# Patient Record
Sex: FEMALE | Race: WHITE | NOT HISPANIC OR LATINO | Employment: FULL TIME | URBAN - METROPOLITAN AREA
[De-identification: names, ages, dates, MRNs, and addresses within clinical notes are randomized per-mention and may not be internally consistent; named-entity substitution may affect disease eponyms.]

---

## 2018-04-11 ENCOUNTER — APPOINTMENT (OUTPATIENT)
Dept: CT IMAGING | Facility: HOSPITAL | Age: 53
End: 2018-04-11

## 2018-04-11 ENCOUNTER — HOSPITAL ENCOUNTER (INPATIENT)
Facility: HOSPITAL | Age: 53
LOS: 2 days | Discharge: HOME | End: 2018-04-14
Attending: EMERGENCY MEDICINE | Admitting: INTERNAL MEDICINE

## 2018-04-11 ENCOUNTER — APPOINTMENT (OUTPATIENT)
Dept: GENERAL RADIOLOGY | Facility: HOSPITAL | Age: 53
End: 2018-04-11

## 2018-04-11 DIAGNOSIS — J44.1 COPD EXACERBATION (HCC): ICD-10-CM

## 2018-04-11 DIAGNOSIS — I20.8 STABLE ANGINA (HCC): ICD-10-CM

## 2018-04-11 DIAGNOSIS — I63.30 CEREBROVASCULAR ACCIDENT (CVA) DUE TO THROMBOSIS OF CEREBRAL ARTERY (HCC): ICD-10-CM

## 2018-04-11 DIAGNOSIS — R56.9 NEW ONSET SEIZURE (HCC): Primary | ICD-10-CM

## 2018-04-11 LAB
ALBUMIN SERPL-MCNC: 3.5 G/DL (ref 3.5–5.2)
ALBUMIN/GLOB SERPL: 0.9 G/DL
ALP SERPL-CCNC: 121 U/L (ref 39–117)
ALT SERPL W P-5'-P-CCNC: 22 U/L (ref 1–33)
ANION GAP SERPL CALCULATED.3IONS-SCNC: 19.3 MMOL/L
AST SERPL-CCNC: 48 U/L (ref 1–32)
BASOPHILS # BLD AUTO: 0.04 10*3/MM3 (ref 0–0.2)
BASOPHILS NFR BLD AUTO: 0.3 % (ref 0–1.5)
BILIRUB SERPL-MCNC: 1.1 MG/DL (ref 0.1–1.2)
BUN BLD-MCNC: 8 MG/DL (ref 6–20)
BUN/CREAT SERPL: 13.6 (ref 7–25)
CALCIUM SPEC-SCNC: 8.8 MG/DL (ref 8.6–10.5)
CHLORIDE SERPL-SCNC: 94 MMOL/L (ref 98–107)
CK SERPL-CCNC: 72 U/L (ref 20–180)
CO2 SERPL-SCNC: 20.7 MMOL/L (ref 22–29)
CREAT BLD-MCNC: 0.59 MG/DL (ref 0.57–1)
DEPRECATED RDW RBC AUTO: 46.5 FL (ref 37–54)
EOSINOPHIL # BLD AUTO: 0.09 10*3/MM3 (ref 0–0.7)
EOSINOPHIL NFR BLD AUTO: 0.7 % (ref 0.3–6.2)
ERYTHROCYTE [DISTWIDTH] IN BLOOD BY AUTOMATED COUNT: 13.1 % (ref 11.7–13)
ETHANOL BLD-MCNC: <10 MG/DL (ref 0–10)
ETHANOL UR QL: <0.01 %
GFR SERPL CREATININE-BSD FRML MDRD: 108 ML/MIN/1.73
GLOBULIN UR ELPH-MCNC: 4 GM/DL
GLUCOSE BLD-MCNC: 210 MG/DL (ref 65–99)
HCT VFR BLD AUTO: 44.1 % (ref 35.6–45.5)
HGB BLD-MCNC: 14.8 G/DL (ref 11.9–15.5)
IMM GRANULOCYTES # BLD: 0.03 10*3/MM3 (ref 0–0.03)
IMM GRANULOCYTES NFR BLD: 0.2 % (ref 0–0.5)
INR PPP: 1.04 (ref 0.9–1.1)
LIPASE SERPL-CCNC: 36 U/L (ref 13–60)
LYMPHOCYTES # BLD AUTO: 4.84 10*3/MM3 (ref 0.9–4.8)
LYMPHOCYTES NFR BLD AUTO: 37.7 % (ref 19.6–45.3)
MAGNESIUM SERPL-MCNC: 1.7 MG/DL (ref 1.6–2.6)
MCH RBC QN AUTO: 32.7 PG (ref 26.9–32)
MCHC RBC AUTO-ENTMCNC: 33.6 G/DL (ref 32.4–36.3)
MCV RBC AUTO: 97.6 FL (ref 80.5–98.2)
MONOCYTES # BLD AUTO: 0.68 10*3/MM3 (ref 0.2–1.2)
MONOCYTES NFR BLD AUTO: 5.3 % (ref 5–12)
NEUTROPHILS # BLD AUTO: 7.17 10*3/MM3 (ref 1.9–8.1)
NEUTROPHILS NFR BLD AUTO: 55.8 % (ref 42.7–76)
PLATELET # BLD AUTO: 150 10*3/MM3 (ref 140–500)
PMV BLD AUTO: 12.3 FL (ref 6–12)
POTASSIUM BLD-SCNC: 3.3 MMOL/L (ref 3.5–5.2)
PROT SERPL-MCNC: 7.5 G/DL (ref 6–8.5)
PROTHROMBIN TIME: 13.4 SECONDS (ref 11.7–14.2)
RBC # BLD AUTO: 4.52 10*6/MM3 (ref 3.9–5.2)
SODIUM BLD-SCNC: 134 MMOL/L (ref 136–145)
T4 FREE SERPL-MCNC: 0.99 NG/DL (ref 0.93–1.7)
TROPONIN T SERPL-MCNC: <0.01 NG/ML (ref 0–0.03)
TSH SERPL DL<=0.05 MIU/L-ACNC: 1.76 MIU/ML (ref 0.27–4.2)
WBC NRBC COR # BLD: 12.85 10*3/MM3 (ref 4.5–10.7)

## 2018-04-11 PROCEDURE — 93005 ELECTROCARDIOGRAM TRACING: CPT | Performed by: EMERGENCY MEDICINE

## 2018-04-11 PROCEDURE — 71046 X-RAY EXAM CHEST 2 VIEWS: CPT

## 2018-04-11 PROCEDURE — 25010000002 METHYLPREDNISOLONE PER 125 MG: Performed by: EMERGENCY MEDICINE

## 2018-04-11 PROCEDURE — 85610 PROTHROMBIN TIME: CPT | Performed by: EMERGENCY MEDICINE

## 2018-04-11 PROCEDURE — 25010000003 LEVETIRACETAM IN NACL 0.75% 1000 MG/100ML SOLUTION: Performed by: EMERGENCY MEDICINE

## 2018-04-11 PROCEDURE — 80053 COMPREHEN METABOLIC PANEL: CPT | Performed by: EMERGENCY MEDICINE

## 2018-04-11 PROCEDURE — 36415 COLL VENOUS BLD VENIPUNCTURE: CPT

## 2018-04-11 PROCEDURE — 80307 DRUG TEST PRSMV CHEM ANLYZR: CPT | Performed by: EMERGENCY MEDICINE

## 2018-04-11 PROCEDURE — 81003 URINALYSIS AUTO W/O SCOPE: CPT | Performed by: EMERGENCY MEDICINE

## 2018-04-11 PROCEDURE — 82550 ASSAY OF CK (CPK): CPT | Performed by: EMERGENCY MEDICINE

## 2018-04-11 PROCEDURE — 83735 ASSAY OF MAGNESIUM: CPT | Performed by: EMERGENCY MEDICINE

## 2018-04-11 PROCEDURE — 99284 EMERGENCY DEPT VISIT MOD MDM: CPT

## 2018-04-11 PROCEDURE — 83690 ASSAY OF LIPASE: CPT | Performed by: EMERGENCY MEDICINE

## 2018-04-11 PROCEDURE — 84439 ASSAY OF FREE THYROXINE: CPT | Performed by: EMERGENCY MEDICINE

## 2018-04-11 PROCEDURE — 84484 ASSAY OF TROPONIN QUANT: CPT | Performed by: EMERGENCY MEDICINE

## 2018-04-11 PROCEDURE — 83036 HEMOGLOBIN GLYCOSYLATED A1C: CPT | Performed by: INTERNAL MEDICINE

## 2018-04-11 PROCEDURE — 85025 COMPLETE CBC W/AUTO DIFF WBC: CPT | Performed by: EMERGENCY MEDICINE

## 2018-04-11 PROCEDURE — 70450 CT HEAD/BRAIN W/O DYE: CPT

## 2018-04-11 PROCEDURE — 84443 ASSAY THYROID STIM HORMONE: CPT | Performed by: EMERGENCY MEDICINE

## 2018-04-11 PROCEDURE — 93010 ELECTROCARDIOGRAM REPORT: CPT | Performed by: INTERNAL MEDICINE

## 2018-04-11 RX ORDER — METHYLPREDNISOLONE SODIUM SUCCINATE 125 MG/2ML
125 INJECTION, POWDER, LYOPHILIZED, FOR SOLUTION INTRAMUSCULAR; INTRAVENOUS ONCE
Status: COMPLETED | OUTPATIENT
Start: 2018-04-11 | End: 2018-04-11

## 2018-04-11 RX ORDER — IPRATROPIUM BROMIDE AND ALBUTEROL SULFATE 2.5; .5 MG/3ML; MG/3ML
3 SOLUTION RESPIRATORY (INHALATION) ONCE
Status: COMPLETED | OUTPATIENT
Start: 2018-04-11 | End: 2018-04-12

## 2018-04-11 RX ORDER — LEVETIRACETAM 10 MG/ML
1000 INJECTION INTRAVASCULAR ONCE
Status: COMPLETED | OUTPATIENT
Start: 2018-04-11 | End: 2018-04-12

## 2018-04-11 RX ORDER — SODIUM CHLORIDE 0.9 % (FLUSH) 0.9 %
10 SYRINGE (ML) INJECTION AS NEEDED
Status: DISCONTINUED | OUTPATIENT
Start: 2018-04-11 | End: 2018-04-14 | Stop reason: HOSPADM

## 2018-04-11 RX ADMIN — METHYLPREDNISOLONE SODIUM SUCCINATE 125 MG: 125 INJECTION, POWDER, FOR SOLUTION INTRAMUSCULAR; INTRAVENOUS at 23:45

## 2018-04-11 RX ADMIN — LEVETIRACETAM 1000 MG: 10 INJECTION INTRAVENOUS at 23:50

## 2018-04-11 RX ADMIN — SODIUM CHLORIDE 500 ML: 9 INJECTION, SOLUTION INTRAVENOUS at 23:54

## 2018-04-12 ENCOUNTER — APPOINTMENT (OUTPATIENT)
Dept: MRI IMAGING | Facility: HOSPITAL | Age: 53
End: 2018-04-12
Attending: INTERNAL MEDICINE

## 2018-04-12 ENCOUNTER — APPOINTMENT (OUTPATIENT)
Dept: NEUROLOGY | Facility: HOSPITAL | Age: 53
End: 2018-04-12
Attending: INTERNAL MEDICINE

## 2018-04-12 ENCOUNTER — APPOINTMENT (OUTPATIENT)
Dept: CARDIOLOGY | Facility: HOSPITAL | Age: 53
End: 2018-04-12
Attending: HOSPITALIST

## 2018-04-12 PROBLEM — R56.9 NEW ONSET SEIZURE (HCC): Status: ACTIVE | Noted: 2018-04-12

## 2018-04-12 PROBLEM — I10 HYPERTENSION: Status: ACTIVE | Noted: 2018-04-12

## 2018-04-12 PROBLEM — J44.9 COPD (CHRONIC OBSTRUCTIVE PULMONARY DISEASE) (HCC): Status: ACTIVE | Noted: 2018-04-12

## 2018-04-12 PROBLEM — E11.9 TYPE 2 DIABETES MELLITUS: Status: ACTIVE | Noted: 2018-04-12

## 2018-04-12 PROBLEM — I63.30 CEREBROVASCULAR ACCIDENT (CVA) DUE TO THROMBOSIS OF CEREBRAL ARTERY (HCC): Status: ACTIVE | Noted: 2018-04-12

## 2018-04-12 LAB
AMPHET+METHAMPHET UR QL: NEGATIVE
ANION GAP SERPL CALCULATED.3IONS-SCNC: 15.4 MMOL/L
ARTERIAL PATENCY WRIST A: ABNORMAL
ASCENDING AORTA: 3.1 CM
ATMOSPHERIC PRESS: 752.2 MMHG
BARBITURATES UR QL SCN: NEGATIVE
BASE EXCESS BLDA CALC-SCNC: 0.1 MMOL/L (ref 0–2)
BASOPHILS # BLD AUTO: 0.01 10*3/MM3 (ref 0–0.2)
BASOPHILS NFR BLD AUTO: 0.1 % (ref 0–1.5)
BDY SITE: ABNORMAL
BENZODIAZ UR QL SCN: NEGATIVE
BH CV ECHO MEAS - ACS: 1.8 CM
BH CV ECHO MEAS - AO MEAN PG (FULL): 1 MMHG
BH CV ECHO MEAS - AO MEAN PG: 6 MMHG
BH CV ECHO MEAS - AO ROOT AREA (BSA CORRECTED): 1.4
BH CV ECHO MEAS - AO ROOT AREA: 6.2 CM^2
BH CV ECHO MEAS - AO ROOT DIAM: 2.8 CM
BH CV ECHO MEAS - AO V2 MEAN: 115 CM/SEC
BH CV ECHO MEAS - AO V2 VTI: 25.2 CM
BH CV ECHO MEAS - AVA(I,A): 2.9 CM^2
BH CV ECHO MEAS - AVA(I,D): 2.9 CM^2
BH CV ECHO MEAS - BSA(HAYCOCK): 2.1 M^2
BH CV ECHO MEAS - BSA: 2 M^2
BH CV ECHO MEAS - BZI_BMI: 36.4 KILOGRAMS/M^2
BH CV ECHO MEAS - BZI_METRIC_HEIGHT: 162.6 CM
BH CV ECHO MEAS - BZI_METRIC_WEIGHT: 96.2 KG
BH CV ECHO MEAS - CONTRAST EF 4CH: 59.1 ML/M^2
BH CV ECHO MEAS - EDV(MOD-SP4): 66 ML
BH CV ECHO MEAS - EDV(TEICH): 92.4 ML
BH CV ECHO MEAS - EF(CUBED): 70.4 %
BH CV ECHO MEAS - EF(MOD-SP4): 59.1 %
BH CV ECHO MEAS - EF(TEICH): 62.1 %
BH CV ECHO MEAS - ESV(MOD-SP4): 27 ML
BH CV ECHO MEAS - ESV(TEICH): 35 ML
BH CV ECHO MEAS - FS: 33.3 %
BH CV ECHO MEAS - IVS/LVPW: 1.1
BH CV ECHO MEAS - IVSD: 1.1 CM
BH CV ECHO MEAS - LAT PEAK E' VEL: 11 CM/SEC
BH CV ECHO MEAS - LV DIASTOLIC VOL/BSA (35-75): 32.9 ML/M^2
BH CV ECHO MEAS - LV MASS(C)D: 164 GRAMS
BH CV ECHO MEAS - LV MASS(C)DI: 81.8 GRAMS/M^2
BH CV ECHO MEAS - LV MEAN PG: 5 MMHG
BH CV ECHO MEAS - LV SYSTOLIC VOL/BSA (12-30): 13.5 ML/M^2
BH CV ECHO MEAS - LV V1 MEAN: 108 CM/SEC
BH CV ECHO MEAS - LV V1 VTI: 23.1 CM
BH CV ECHO MEAS - LVIDD: 4.5 CM
BH CV ECHO MEAS - LVIDS: 3 CM
BH CV ECHO MEAS - LVLD AP4: 8.2 CM
BH CV ECHO MEAS - LVLS AP4: 6.2 CM
BH CV ECHO MEAS - LVOT AREA (M): 3.1 CM^2
BH CV ECHO MEAS - LVOT AREA: 3.1 CM^2
BH CV ECHO MEAS - LVOT DIAM: 2 CM
BH CV ECHO MEAS - LVPWD: 1 CM
BH CV ECHO MEAS - MED PEAK E' VEL: 10 CM/SEC
BH CV ECHO MEAS - MV A DUR: 0.08 SEC
BH CV ECHO MEAS - MV A MAX VEL: 136 CM/SEC
BH CV ECHO MEAS - MV E MAX VEL: 73.5 CM/SEC
BH CV ECHO MEAS - MV E/A: 0.54
BH CV ECHO MEAS - MV MEAN PG: 3 MMHG
BH CV ECHO MEAS - MV V2 MEAN: 84.5 CM/SEC
BH CV ECHO MEAS - MV V2 VTI: 16.6 CM
BH CV ECHO MEAS - MVA(VTI): 4.4 CM^2
BH CV ECHO MEAS - PA ACC SLOPE: 11.2 CM/SEC^2
BH CV ECHO MEAS - PA ACC TIME: 0.13 SEC
BH CV ECHO MEAS - PA MAX PG (FULL): 0.83 MMHG
BH CV ECHO MEAS - PA MAX PG: 3.8 MMHG
BH CV ECHO MEAS - PA PR(ACCEL): 19.6 MMHG
BH CV ECHO MEAS - PA V2 MAX: 97.1 CM/SEC
BH CV ECHO MEAS - PULM A REVS DUR: 0.08 SEC
BH CV ECHO MEAS - PULM A REVS VEL: 47.9 CM/SEC
BH CV ECHO MEAS - PULM DIAS VEL: 57.3 CM/SEC
BH CV ECHO MEAS - PULM S/D: 1.4
BH CV ECHO MEAS - PULM SYS VEL: 81.7 CM/SEC
BH CV ECHO MEAS - PVA(V,A): 2.5 CM^2
BH CV ECHO MEAS - PVA(V,D): 2.5 CM^2
BH CV ECHO MEAS - QP/QS: 0.61
BH CV ECHO MEAS - RV MAX PG: 2.9 MMHG
BH CV ECHO MEAS - RV MEAN PG: 2 MMHG
BH CV ECHO MEAS - RV V1 MAX: 85.7 CM/SEC
BH CV ECHO MEAS - RV V1 MEAN: 62.2 CM/SEC
BH CV ECHO MEAS - RV V1 VTI: 15.5 CM
BH CV ECHO MEAS - RVOT AREA: 2.8 CM^2
BH CV ECHO MEAS - RVOT DIAM: 1.9 CM
BH CV ECHO MEAS - SI(AO): 77.4 ML/M^2
BH CV ECHO MEAS - SI(CUBED): 32 ML/M^2
BH CV ECHO MEAS - SI(LVOT): 36.2 ML/M^2
BH CV ECHO MEAS - SI(MOD-SP4): 19.5 ML/M^2
BH CV ECHO MEAS - SI(TEICH): 28.7 ML/M^2
BH CV ECHO MEAS - SV(AO): 155.2 ML
BH CV ECHO MEAS - SV(CUBED): 64.1 ML
BH CV ECHO MEAS - SV(LVOT): 72.6 ML
BH CV ECHO MEAS - SV(MOD-SP4): 39 ML
BH CV ECHO MEAS - SV(RVOT): 43.9 ML
BH CV ECHO MEAS - SV(TEICH): 57.4 ML
BH CV ECHO MEAS - TAPSE (>1.6): 2.1 CM2
BH CV XLRA - RV BASE: 2.5 CM
BH CV XLRA - TDI S': 12 CM/SEC
BILIRUB UR QL STRIP: NEGATIVE
BUN BLD-MCNC: 8 MG/DL (ref 6–20)
BUN/CREAT SERPL: 14.8 (ref 7–25)
CALCIUM SPEC-SCNC: 8.5 MG/DL (ref 8.6–10.5)
CANNABINOIDS SERPL QL: NEGATIVE
CHLORIDE SERPL-SCNC: 99 MMOL/L (ref 98–107)
CLARITY UR: CLEAR
CO2 SERPL-SCNC: 22.6 MMOL/L (ref 22–29)
COCAINE UR QL: NEGATIVE
COLOR UR: YELLOW
CREAT BLD-MCNC: 0.54 MG/DL (ref 0.57–1)
CRP SERPL-MCNC: 1.36 MG/DL (ref 0–0.5)
DEPRECATED RDW RBC AUTO: 46.2 FL (ref 37–54)
E/E' RATIO: 7
EOSINOPHIL # BLD AUTO: 0.01 10*3/MM3 (ref 0–0.7)
EOSINOPHIL NFR BLD AUTO: 0.1 % (ref 0.3–6.2)
ERYTHROCYTE [DISTWIDTH] IN BLOOD BY AUTOMATED COUNT: 13.2 % (ref 11.7–13)
ERYTHROCYTE [SEDIMENTATION RATE] IN BLOOD: 34 MM/HR (ref 0–20)
GFR SERPL CREATININE-BSD FRML MDRD: 120 ML/MIN/1.73
GGT SERPL-CCNC: 77 U/L (ref 5–36)
GLUCOSE BLD-MCNC: 329 MG/DL (ref 65–99)
GLUCOSE BLDC GLUCOMTR-MCNC: 257 MG/DL (ref 70–130)
GLUCOSE BLDC GLUCOMTR-MCNC: 277 MG/DL (ref 70–130)
GLUCOSE BLDC GLUCOMTR-MCNC: 324 MG/DL (ref 70–130)
GLUCOSE BLDC GLUCOMTR-MCNC: 354 MG/DL (ref 70–130)
GLUCOSE BLDC GLUCOMTR-MCNC: 395 MG/DL (ref 70–130)
GLUCOSE BLDC GLUCOMTR-MCNC: 405 MG/DL (ref 70–130)
GLUCOSE UR STRIP-MCNC: ABNORMAL MG/DL
HBA1C MFR BLD: 8.8 % (ref 4.8–5.6)
HCO3 BLDA-SCNC: 24.1 MMOL/L (ref 22–28)
HCT VFR BLD AUTO: 40.8 % (ref 35.6–45.5)
HGB BLD-MCNC: 13.5 G/DL (ref 11.9–15.5)
HGB UR QL STRIP.AUTO: NEGATIVE
HOROWITZ INDEX BLD+IHG-RTO: 21 %
IMM GRANULOCYTES # BLD: 0.02 10*3/MM3 (ref 0–0.03)
IMM GRANULOCYTES NFR BLD: 0.2 % (ref 0–0.5)
KETONES UR QL STRIP: NEGATIVE
LEFT ATRIUM VOLUME INDEX: 12 ML/M2
LEUKOCYTE ESTERASE UR QL STRIP.AUTO: NEGATIVE
LV EF 2D ECHO EST: 59 %
LYMPHOCYTES # BLD AUTO: 1.01 10*3/MM3 (ref 0.9–4.8)
LYMPHOCYTES NFR BLD AUTO: 10.3 % (ref 19.6–45.3)
MAXIMAL PREDICTED HEART RATE: 170 BPM
MCH RBC QN AUTO: 31.9 PG (ref 26.9–32)
MCHC RBC AUTO-ENTMCNC: 33.1 G/DL (ref 32.4–36.3)
MCV RBC AUTO: 96.5 FL (ref 80.5–98.2)
METHADONE UR QL SCN: NEGATIVE
MODALITY: ABNORMAL
MONOCYTES # BLD AUTO: 0.14 10*3/MM3 (ref 0.2–1.2)
MONOCYTES NFR BLD AUTO: 1.4 % (ref 5–12)
NEUTROPHILS # BLD AUTO: 8.61 10*3/MM3 (ref 1.9–8.1)
NEUTROPHILS NFR BLD AUTO: 87.9 % (ref 42.7–76)
NITRITE UR QL STRIP: NEGATIVE
O2 A-A PPRESDIFF RESPIRATORY: 0.5 MMHG
OPIATES UR QL: NEGATIVE
OXYCODONE UR QL SCN: NEGATIVE
PCO2 BLDA: 36.4 MM HG (ref 35–45)
PH BLDA: 7.43 PH UNITS (ref 7.35–7.45)
PH UR STRIP.AUTO: 5.5 [PH] (ref 5–8)
PLATELET # BLD AUTO: 125 10*3/MM3 (ref 140–500)
PMV BLD AUTO: 12.7 FL (ref 6–12)
PO2 BLDA: 60.7 MM HG (ref 80–100)
POTASSIUM BLD-SCNC: 3.6 MMOL/L (ref 3.5–5.2)
PROT UR QL STRIP: NEGATIVE
RBC # BLD AUTO: 4.23 10*6/MM3 (ref 3.9–5.2)
SAO2 % BLDCOA: 91.7 % (ref 92–99)
SINUS: 3.1 CM
SODIUM BLD-SCNC: 137 MMOL/L (ref 136–145)
SP GR UR STRIP: 1.02 (ref 1–1.03)
STJ: 2.3 CM
STRESS TARGET HR: 145 BPM
TOTAL RATE: 24 BREATHS/MINUTE
UROBILINOGEN UR QL STRIP: ABNORMAL
WBC NRBC COR # BLD: 9.8 10*3/MM3 (ref 4.5–10.7)

## 2018-04-12 PROCEDURE — A9577 INJ MULTIHANCE: HCPCS | Performed by: INTERNAL MEDICINE

## 2018-04-12 PROCEDURE — 0 GADOBENATE DIMEGLUMINE 529 MG/ML SOLUTION: Performed by: INTERNAL MEDICINE

## 2018-04-12 PROCEDURE — 25010000002 ENOXAPARIN PER 10 MG: Performed by: INTERNAL MEDICINE

## 2018-04-12 PROCEDURE — 85025 COMPLETE CBC W/AUTO DIFF WBC: CPT | Performed by: INTERNAL MEDICINE

## 2018-04-12 PROCEDURE — 94640 AIRWAY INHALATION TREATMENT: CPT

## 2018-04-12 PROCEDURE — 97162 PT EVAL MOD COMPLEX 30 MIN: CPT

## 2018-04-12 PROCEDURE — 80048 BASIC METABOLIC PNL TOTAL CA: CPT | Performed by: INTERNAL MEDICINE

## 2018-04-12 PROCEDURE — 82962 GLUCOSE BLOOD TEST: CPT

## 2018-04-12 PROCEDURE — 99255 IP/OBS CONSLTJ NEW/EST HI 80: CPT | Performed by: RADIOLOGY

## 2018-04-12 PROCEDURE — 94799 UNLISTED PULMONARY SVC/PX: CPT

## 2018-04-12 PROCEDURE — 85652 RBC SED RATE AUTOMATED: CPT | Performed by: RADIOLOGY

## 2018-04-12 PROCEDURE — 92610 EVALUATE SWALLOWING FUNCTION: CPT

## 2018-04-12 PROCEDURE — 82977 ASSAY OF GGT: CPT | Performed by: INTERNAL MEDICINE

## 2018-04-12 PROCEDURE — 97110 THERAPEUTIC EXERCISES: CPT

## 2018-04-12 PROCEDURE — 63710000001 INSULIN ASPART PER 5 UNITS: Performed by: INTERNAL MEDICINE

## 2018-04-12 PROCEDURE — 95819 EEG AWAKE AND ASLEEP: CPT | Performed by: PSYCHIATRY & NEUROLOGY

## 2018-04-12 PROCEDURE — 93306 TTE W/DOPPLER COMPLETE: CPT

## 2018-04-12 PROCEDURE — 99406 BEHAV CHNG SMOKING 3-10 MIN: CPT | Performed by: RADIOLOGY

## 2018-04-12 PROCEDURE — 70553 MRI BRAIN STEM W/O & W/DYE: CPT

## 2018-04-12 PROCEDURE — 25010000002 LEVETIRACETAM IN NACL 0.82% 500 MG/100ML SOLUTION: Performed by: INTERNAL MEDICINE

## 2018-04-12 PROCEDURE — 95816 EEG AWAKE AND DROWSY: CPT

## 2018-04-12 PROCEDURE — 36600 WITHDRAWAL OF ARTERIAL BLOOD: CPT

## 2018-04-12 PROCEDURE — 82803 BLOOD GASES ANY COMBINATION: CPT

## 2018-04-12 PROCEDURE — 86140 C-REACTIVE PROTEIN: CPT | Performed by: RADIOLOGY

## 2018-04-12 PROCEDURE — 93306 TTE W/DOPPLER COMPLETE: CPT | Performed by: INTERNAL MEDICINE

## 2018-04-12 RX ORDER — BENZONATATE 100 MG/1
200 CAPSULE ORAL 3 TIMES DAILY PRN
Status: DISCONTINUED | OUTPATIENT
Start: 2018-04-12 | End: 2018-04-14 | Stop reason: HOSPADM

## 2018-04-12 RX ORDER — NICOTINE POLACRILEX 4 MG
15 LOZENGE BUCCAL
Status: DISCONTINUED | OUTPATIENT
Start: 2018-04-12 | End: 2018-04-14 | Stop reason: HOSPADM

## 2018-04-12 RX ORDER — GABAPENTIN 300 MG/1
300 CAPSULE ORAL EVERY 8 HOURS SCHEDULED
Status: DISCONTINUED | OUTPATIENT
Start: 2018-04-12 | End: 2018-04-14 | Stop reason: HOSPADM

## 2018-04-12 RX ORDER — SIMVASTATIN 40 MG
40 TABLET ORAL NIGHTLY
COMMUNITY
End: 2018-04-14 | Stop reason: HOSPADM

## 2018-04-12 RX ORDER — LISINOPRIL 20 MG/1
20 TABLET ORAL DAILY
COMMUNITY

## 2018-04-12 RX ORDER — SODIUM CHLORIDE 0.9 % (FLUSH) 0.9 %
1-10 SYRINGE (ML) INJECTION AS NEEDED
Status: DISCONTINUED | OUTPATIENT
Start: 2018-04-12 | End: 2018-04-14 | Stop reason: HOSPADM

## 2018-04-12 RX ORDER — GLIPIZIDE 5 MG/1
5 TABLET ORAL
Status: DISCONTINUED | OUTPATIENT
Start: 2018-04-12 | End: 2018-04-13

## 2018-04-12 RX ORDER — POTASSIUM CHLORIDE 750 MG/1
40 CAPSULE, EXTENDED RELEASE ORAL ONCE
Status: COMPLETED | OUTPATIENT
Start: 2018-04-12 | End: 2018-04-12

## 2018-04-12 RX ORDER — GLIPIZIDE 5 MG/1
5 TABLET ORAL EVERY MORNING
Status: ON HOLD | COMMUNITY
End: 2018-04-14

## 2018-04-12 RX ORDER — GLIPIZIDE 5 MG/1
5 TABLET ORAL
Status: DISCONTINUED | OUTPATIENT
Start: 2018-04-12 | End: 2018-04-12

## 2018-04-12 RX ORDER — ATORVASTATIN CALCIUM 20 MG/1
40 TABLET, FILM COATED ORAL DAILY
Status: DISCONTINUED | OUTPATIENT
Start: 2018-04-12 | End: 2018-04-14 | Stop reason: HOSPADM

## 2018-04-12 RX ORDER — ASPIRIN 325 MG
325 TABLET, DELAYED RELEASE (ENTERIC COATED) ORAL DAILY
Status: DISCONTINUED | OUTPATIENT
Start: 2018-04-13 | End: 2018-04-14 | Stop reason: HOSPADM

## 2018-04-12 RX ORDER — GABAPENTIN 600 MG/1
600 TABLET ORAL 4 TIMES DAILY
Status: ON HOLD | COMMUNITY
End: 2018-04-12 | Stop reason: ALTCHOICE

## 2018-04-12 RX ORDER — LISINOPRIL 10 MG/1
10 TABLET ORAL
Status: DISCONTINUED | OUTPATIENT
Start: 2018-04-12 | End: 2018-04-14 | Stop reason: HOSPADM

## 2018-04-12 RX ORDER — LEVETIRACETAM 5 MG/ML
500 INJECTION INTRAVASCULAR EVERY 12 HOURS SCHEDULED
Status: DISCONTINUED | OUTPATIENT
Start: 2018-04-12 | End: 2018-04-14

## 2018-04-12 RX ORDER — DULOXETIN HYDROCHLORIDE 60 MG/1
60 CAPSULE, DELAYED RELEASE ORAL DAILY
COMMUNITY
End: 2020-10-26

## 2018-04-12 RX ORDER — SODIUM CHLORIDE 9 MG/ML
75 INJECTION, SOLUTION INTRAVENOUS CONTINUOUS
Status: DISCONTINUED | OUTPATIENT
Start: 2018-04-12 | End: 2018-04-14 | Stop reason: HOSPADM

## 2018-04-12 RX ORDER — IPRATROPIUM BROMIDE AND ALBUTEROL SULFATE 2.5; .5 MG/3ML; MG/3ML
3 SOLUTION RESPIRATORY (INHALATION)
Status: DISCONTINUED | OUTPATIENT
Start: 2018-04-12 | End: 2018-04-14 | Stop reason: HOSPADM

## 2018-04-12 RX ORDER — DEXTROSE MONOHYDRATE 25 G/50ML
25 INJECTION, SOLUTION INTRAVENOUS
Status: DISCONTINUED | OUTPATIENT
Start: 2018-04-12 | End: 2018-04-14 | Stop reason: HOSPADM

## 2018-04-12 RX ORDER — METOPROLOL SUCCINATE 25 MG/1
25 TABLET, EXTENDED RELEASE ORAL DAILY
COMMUNITY

## 2018-04-12 RX ORDER — ASPIRIN 325 MG
325 TABLET ORAL ONCE
Status: COMPLETED | OUTPATIENT
Start: 2018-04-12 | End: 2018-04-12

## 2018-04-12 RX ORDER — MONTELUKAST SODIUM 10 MG/1
10 TABLET ORAL NIGHTLY
COMMUNITY

## 2018-04-12 RX ADMIN — INSULIN ASPART 6 UNITS: 100 INJECTION, SOLUTION INTRAVENOUS; SUBCUTANEOUS at 20:59

## 2018-04-12 RX ADMIN — INSULIN ASPART 6 UNITS: 100 INJECTION, SOLUTION INTRAVENOUS; SUBCUTANEOUS at 03:55

## 2018-04-12 RX ADMIN — LISINOPRIL 10 MG: 10 TABLET ORAL at 08:40

## 2018-04-12 RX ADMIN — SODIUM CHLORIDE 75 ML/HR: 9 INJECTION, SOLUTION INTRAVENOUS at 16:45

## 2018-04-12 RX ADMIN — METOPROLOL TARTRATE 12.5 MG: 25 TABLET ORAL at 21:00

## 2018-04-12 RX ADMIN — ENOXAPARIN SODIUM 40 MG: 40 INJECTION SUBCUTANEOUS at 08:40

## 2018-04-12 RX ADMIN — IPRATROPIUM BROMIDE AND ALBUTEROL SULFATE 3 ML: .5; 3 SOLUTION RESPIRATORY (INHALATION) at 14:59

## 2018-04-12 RX ADMIN — GABAPENTIN 300 MG: 300 CAPSULE ORAL at 20:59

## 2018-04-12 RX ADMIN — IPRATROPIUM BROMIDE AND ALBUTEROL SULFATE 3 ML: .5; 3 SOLUTION RESPIRATORY (INHALATION) at 08:46

## 2018-04-12 RX ADMIN — GLIPIZIDE 5 MG: 5 TABLET ORAL at 08:40

## 2018-04-12 RX ADMIN — ASPIRIN 325 MG: 325 TABLET ORAL at 09:42

## 2018-04-12 RX ADMIN — INSULIN ASPART 6 UNITS: 100 INJECTION, SOLUTION INTRAVENOUS; SUBCUTANEOUS at 17:42

## 2018-04-12 RX ADMIN — LEVETIRACETAM 500 MG: 5 INJECTION INTRAVENOUS at 06:05

## 2018-04-12 RX ADMIN — IPRATROPIUM BROMIDE AND ALBUTEROL SULFATE 3 ML: .5; 3 SOLUTION RESPIRATORY (INHALATION) at 00:01

## 2018-04-12 RX ADMIN — INSULIN ASPART 8 UNITS: 100 INJECTION, SOLUTION INTRAVENOUS; SUBCUTANEOUS at 08:41

## 2018-04-12 RX ADMIN — METOPROLOL TARTRATE 12.5 MG: 25 TABLET ORAL at 08:40

## 2018-04-12 RX ADMIN — METOPROLOL TARTRATE 12.5 MG: 25 TABLET ORAL at 02:55

## 2018-04-12 RX ADMIN — ATORVASTATIN CALCIUM 40 MG: 20 TABLET, FILM COATED ORAL at 17:42

## 2018-04-12 RX ADMIN — IPRATROPIUM BROMIDE AND ALBUTEROL SULFATE 3 ML: .5; 3 SOLUTION RESPIRATORY (INHALATION) at 19:58

## 2018-04-12 RX ADMIN — GLIPIZIDE 5 MG: 5 TABLET ORAL at 16:44

## 2018-04-12 RX ADMIN — GADOBENATE DIMEGLUMINE 19 ML: 529 INJECTION, SOLUTION INTRAVENOUS at 07:29

## 2018-04-12 RX ADMIN — LEVETIRACETAM 500 MG: 5 INJECTION INTRAVENOUS at 20:59

## 2018-04-12 RX ADMIN — POTASSIUM CHLORIDE 40 MEQ: 750 CAPSULE, EXTENDED RELEASE ORAL at 03:16

## 2018-04-12 RX ADMIN — INSULIN ASPART 8 UNITS: 100 INJECTION, SOLUTION INTRAVENOUS; SUBCUTANEOUS at 11:52

## 2018-04-12 RX ADMIN — SODIUM CHLORIDE 75 ML/HR: 9 INJECTION, SOLUTION INTRAVENOUS at 03:16

## 2018-04-12 NOTE — PLAN OF CARE
Problem: Patient Care Overview  Goal: Plan of Care Review  Outcome: Ongoing (interventions implemented as appropriate)   04/12/18 0453   Coping/Psychosocial   Plan of Care Reviewed With patient   Plan of Care Review   Progress improving   OTHER   Outcome Summary admitted pt to unit, vss however slightly tachy, ssi administered, k replaced, ns @75, neuro consulted, resting well throughout shift, will continue to monitor       Problem: Skin Injury Risk (Adult)  Goal: Identify Related Risk Factors and Signs and Symptoms  Outcome: Ongoing (interventions implemented as appropriate)      Problem: Fall Risk (Adult)  Goal: Identify Related Risk Factors and Signs and Symptoms  Outcome: Ongoing (interventions implemented as appropriate)      Problem: Seizure Disorder/Epilepsy (Adult)  Goal: Signs and Symptoms of Listed Potential Problems Will be Absent, Minimized or Managed (Seizure Disorder/Epilepsy)  Outcome: Ongoing (interventions implemented as appropriate)

## 2018-04-12 NOTE — PLAN OF CARE
Problem: Patient Care Overview  Goal: Plan of Care Review   04/12/18 1208   OTHER   Outcome Summary Pt admitted 4/11 w/ seizure episode, h/o COPD. Pt reports no other falls, no use of AD, independent w/ dressing/bathing but does not leave apt w/o dtr - 18 steps to enter home. Today pt able to ambulate 150' w/ contact assist, single UE support on IV pole and reaching for weaver rail. Pt reports furniture surfing at baseline - discussed obtaining cane for home, activity/walking recommendations whileinpatient and PT POC.

## 2018-04-12 NOTE — H&P
Patient Identification:  Name: Nerissa Oliva  Age/Sex: 52 y.o. female  :  1965  MRN: [id number]         Primary Care Physician: No Known Provider    Chief Complaint   Patient presents with   • Syncope       Subjective   Patient is a 52 y.o. female with a h/o DM2, HTN, reported tachycardia, COPD who comes in for weakness and seizure-like episodes. She states these have been going on for a year now but are becoming more frequent. Describes this as shaking spells that happen out of the blue. She does not remember these episodes and only remembers waking up afterward. Her son states her arms tighten up and that she shakes. Her eyes will be open but she will not respond. She has had urinary incontinence but no fecal incont. No tongue biting either. States she has been under a lot of stress recently especially with her mother passing recently. She does have chronic cough and sob but is not on oxygen at home. Only uses albuterol inhaler. States that she can get ringing in her ears or blurry vision prior to these episodes happening.     History of Present Illness    Past Medical History:   Diagnosis Date   • COPD (chronic obstructive pulmonary disease)    • Diabetes mellitus    • Hyperlipidemia    • Hypertension    • Neuropathy    • Rheumatoid arthritis    • Tachycardia      Past Surgical History:   Procedure Laterality Date   • BLADDER REPAIR      lift   • CARTILAGE SURGERY Bilateral     knees   • HERNIA REPAIR     • HYSTERECTOMY     • RECTAL PROLAPSE REPAIR       History reviewed. No pertinent family history.  Social History   Substance Use Topics   • Smoking status: Current Every Day Smoker   • Smokeless tobacco: Not on file   • Alcohol use No       (Not in a hospital admission)  Allergies:  Review of patient's allergies indicates no known allergies.    Review of Systems   Constitutional: Positive for fatigue. Negative for chills and fever.   HENT: Negative.    Eyes: Negative.    Respiratory: Positive for  cough and shortness of breath.    Cardiovascular: Negative.    Gastrointestinal: Negative.    Endocrine: Negative.    Genitourinary: Negative.    Musculoskeletal: Negative.    Skin: Negative.    Neurological: Positive for seizures, syncope and weakness. Negative for numbness and headaches.   Hematological: Negative.    Psychiatric/Behavioral: Negative.         Objective    Vital Signs  Temp:  [98.3 °F (36.8 °C)] 98.3 °F (36.8 °C)  Heart Rate:  [100-119] 119  Resp:  [16-18] 16  BP: (103-150)/(53-91) 131/80  Body mass index is 36.05 kg/m².    Physical Exam   Constitutional: She is oriented to person, place, and time. She appears well-developed and well-nourished.   HENT:   Head: Normocephalic and atraumatic.   Mouth/Throat: No oropharyngeal exudate.   Eyes: Conjunctivae are normal. No scleral icterus.   Neck: Normal range of motion. No JVD present. No tracheal deviation present.   Cardiovascular: Regular rhythm.    No murmur heard.  tachy   Pulmonary/Chest: Effort normal.   Decreased breath sounds   Abdominal: Soft. Bowel sounds are normal. She exhibits no distension. There is no tenderness.   Musculoskeletal: She exhibits no edema or deformity.   Neurological: She is alert and oriented to person, place, and time.   Skin: Skin is warm and dry.   Psychiatric: She has a normal mood and affect. Her behavior is normal. Judgment and thought content normal.   Nursing note and vitals reviewed.      Results Review:   I reviewed the patient's new clinical results.  Imaging Results (last 24 hours)     Procedure Component Value Units Date/Time    CT Head Without Contrast [[id number]] Collected:  04/11/18 2336     Updated:  04/11/18 2336    Narrative:       CT SCAN OF THE BRAIN WITHOUT CONTRAST.     TECHNIQUE: Radiation dose reduction techniques were utilized, including  automated exposure control and exposure modulation based on body size.  Multiple axial images of the brain were obtained from the vertex to the  base of the  brain.     HISTORY: Seizure.     COMPARISON: No prior studies for comparison.     FINDINGS:   Midline structures are within normal limits, there is no hydrocephalus.  Gray-white matter differentiation is maintained.         Orbits are within normal limits. No significant mucosal disease of the  para-nasal sinuses. Mastoid air cells are well aerated.              Impression:       No definite acute intracranial pathology.                 XR Chest 2 View [[id number]] Collected:  04/11/18 2323     Updated:  04/11/18 2324    Narrative:       CHEST PA AND LATERAL.     HISTORY: Shortness of breath.     COMPARISON: No prior studies for comparison.     FINDINGS:  Cardiomediastinal silhouette is within normal limits. Right  cardiophrenic angle is obliterated.     There is no consolidation or effusion.              Impression:       1.  Obliteration of the right cardiophrenic angle may be due to  atelectasis or a large fat pad.  2.  Further evaluation with CT scan is recommended.                 Assessment/Plan     Principal Problem:    New onset seizure  Active Problems:    COPD (chronic obstructive pulmonary disease)    Type 2 diabetes mellitus    Hypertension      Assessment & Plan  1. Seizure  - feel this could be pseudoseizures related to stress  - will obtain MRI and EEG to rule out any major abnormalities  - Neuro consult  - cont Keppra  - seizure precautions    2. Leukocytosis  - likely reactive from possible seizure  - monitor for now    3. DM2  - restart glipizide and SSI  - CC diet  - A1c      4. COPD  - she was given solumedrol in ED for possible exacerbation  - I do not hear any wheezing on exam so will continue with just duonebs  - reassess in am and if worsening may need to add back steroids    5. Tachycardia  - restarted metoprolol    6. HTN  - restarted lisinopril      I discussed the patients findings and my recommendations with patient and family.          Grady Mejia MD  [facility]  04/12/18  2:50 AM

## 2018-04-12 NOTE — CONSULTS
"Diabetes Education  Assessment/Teaching    Patient Name:  Nerissa Oliva  YOB: 1965  MRN: [id number]  Admit Date:  4/11/2018      Assessment Date:  4/12/2018  Flowsheet Row Most Recent Value   General Information    Height 162.6 cm (64\")   Height Method Stated   Weight 96.6 kg (212 lb 14.4 oz)   Weight Method Bed scale   Pregnancy Assessment   Diabetes History   What type of diabetes do you have? Type 2   Current DM knowledge fair   Have you had diabetes education/teaching in the past? no   Do you test your blood sugar at home? no   Do you have any diabetes complications? circulation problems, heart disease   Education Preferences   Nutrition Information   Assessment Topics   Healthy Eating - Assessment Needs education   Being Active - Assessment Needs education   Taking Medication - Assessment Competent   Problem Solving - Assessment Needs education   Reducing Risk - Assessment Needs education   Healthy Coping - Assessment Needs education   Monitoring - Assessment Needs education   DM Goals          Flowsheet Row Most Recent Value   DM Education Needs   Meter Meter provided   Meter Type One Touch [Onetouch Verio]   Frequency of Testing 2 times a day [fasting and 2 hours after a meal]   Blood Glucose Target Range fasting  and less than 180 2 hours after a meal   Medication Oral   Problem Solving Hypoglycemia, Hyperglycemia, Sick days, Signs, Symptoms, Treatment   Reducing Risks A1C testing, Cardiovascular, Blood pressure   Physical Activity None   Physical Activity Frequency Discussed exercise importance   Healthy Coping Appropriate   Discharge Plan Home   Motivation Engaged   Teaching Method Explanation, Discussion, Demonstration, Handouts, Teach back   Patient Response Demonstrates adequately, Verbalized understanding            Other Comments:  Provided patient with a OneTouch Verio BGM and instructed in use.  She was able to correctly demonstrate how to use via teach back.  I encouraged " her to check her blood sugars at least 2 times a day, fasting and 2 hours after a meal and I gave her the parameters of where each of these numbers should be.  I also explained to her what an A1C is and that goal for her is 7.0 or less.  She states that she drinks a pitcher of sweet tea a day ( 2 cups of sugar added) and gets a large sweet tea at [employer] every morning.  I highly recommended that she cut back on the amount of sugar she adds as she states that she will not drink unsweetened tea, or use artificial sweeteners. We also briefly discussed chronic complications related to uncontrolled diabetes.  Thank you for this referral.  Please reconsult if needed.        Electronically signed by:  Luna Wan RN  04/12/18 4:35 PM

## 2018-04-12 NOTE — ED PROVIDER NOTES
" EMERGENCY DEPARTMENT ENCOUNTER    CHIEF COMPLAINT  Chief Complaint: syncope  History given by: patient  History limited by: nothing  Room Number: 416/1  PMD: No Known Provider      HPI:  Pt is a 52 y.o. female who presents complaining of syncope earlier tonight while Pt was walking. Pt states that she has episodes where she \"shakes really badly\" and then she \"blacks out\" and does not remember what happened. Pt also c/o SOA for the past several days and right sided chest pain under her breast. She states that she does not normally smoke but she has the last couple of days.  Pt also states that she has had increased stress recently due to the recent death of her mother.  Pt has a h/x of COPD, DM, and rheumatoid arthritis  Pt's family is at bedside and they are describing seizure like activity that happens almost every single day. The episodes last for 30-45 seconds. They state that she \"leans over, tightens up, and shakes\". They state that she becomes groggy and it takes her at least 15 minutes for her to get back to her normal level of consciousness. Her family states that tonight's episode was a little different and that she did not become tense and she was less groggy than usual afterwards.     Duration:  Several seconds  Onset: sudden  Timing: constant  Location: n/a  Radiation: n/a  Quality: \"blacking out\"   Intensity/Severity: moderate  Progression: unchanged  Associated Symptoms: grogginess, tremors, SOA, right sided chest pain  Aggravating Factors: none  Alleviating Factors: none  Previous Episodes: Pt has similar episodes almost every day  Treatment before arrival: none    PAST MEDICAL HISTORY  Active Ambulatory Problems     Diagnosis Date Noted   • No Active Ambulatory Problems     Resolved Ambulatory Problems     Diagnosis Date Noted   • No Resolved Ambulatory Problems     Past Medical History:   Diagnosis Date   • COPD (chronic obstructive pulmonary disease)    • Diabetes mellitus    • Hyperlipidemia    • " Hypertension    • Neuropathy    • Rheumatoid arthritis    • Tachycardia        PAST SURGICAL HISTORY  Past Surgical History:   Procedure Laterality Date   • BLADDER REPAIR      lift   • CARTILAGE SURGERY Bilateral     knees   • HERNIA REPAIR     • HYSTERECTOMY     • RECTAL PROLAPSE REPAIR         FAMILY HISTORY  History reviewed. No pertinent family history.    SOCIAL HISTORY  Social History     Social History   • Marital status: Single     Spouse name: N/A   • Number of children: N/A   • Years of education: N/A     Occupational History   • Not on file.     Social History Main Topics   • Smoking status: Current Every Day Smoker   • Smokeless tobacco: Not on file   • Alcohol use No   • Drug use: No   • Sexual activity: Not on file     Other Topics Concern   • Not on file     Social History Narrative   • No narrative on file       ALLERGIES  Review of patient's allergies indicates no known allergies.    REVIEW OF SYSTEMS  Review of Systems   Constitutional: Negative for fever.        Pt becomes groggy and confused after episodes.   HENT: Negative for sore throat.    Eyes: Negative.    Respiratory: Positive for shortness of breath. Negative for cough.    Cardiovascular: Positive for chest pain (right sided).   Gastrointestinal: Negative for abdominal pain, diarrhea and vomiting.   Genitourinary: Negative for dysuria.   Musculoskeletal: Negative for neck pain.   Skin: Negative for rash.   Allergic/Immunologic: Negative.    Neurological: Positive for tremors and syncope. Negative for weakness, numbness and headaches.   Hematological: Negative.    Psychiatric/Behavioral: Positive for confusion.   All other systems reviewed and are negative.      PHYSICAL EXAM  ED Triage Vitals   Temp Heart Rate Resp BP SpO2   04/11/18 2231 04/11/18 2228 04/11/18 2228 04/11/18 2228 04/11/18 2228   98.3 °F (36.8 °C) 100 18 150/76 95 %      Temp src Heart Rate Source Patient Position BP Location FiO2 (%)   04/11/18 2231 -- -- -- --   Oral            Physical Exam   Constitutional: She is oriented to person, place, and time and well-developed, well-nourished, and in no distress. No distress.   HENT:   Head: Normocephalic and atraumatic.   Mouth/Throat: Mucous membranes are normal.   No tongue trauma   Eyes: EOM are normal. Pupils are equal, round, and reactive to light.   Neck: Normal range of motion. Neck supple.   Cardiovascular: Regular rhythm and normal heart sounds.  Tachycardia present.    Rate 110   Pulmonary/Chest: Effort normal. No respiratory distress. She has wheezes (bilaterally).   Abdominal: Soft. There is no tenderness. There is no rebound and no guarding.   Musculoskeletal: Normal range of motion. She exhibits no edema.   No pedal edema   Neurological: She is alert and oriented to person, place, and time. She has normal sensation and normal strength.   Skin: Skin is warm and dry. No rash noted.   Psychiatric: Mood and affect normal.   Nursing note and vitals reviewed.      LAB RESULTS  Lab Results (last 24 hours)     Procedure Component Value Units Date/Time    CBC & Differential [[id number]] Collected:  04/11/18 2301    Specimen:  Blood Updated:  04/11/18 2317    Narrative:       The following orders were created for panel order CBC & Differential.  Procedure                               Abnormality         Status                     ---------                               -----------         ------                     CBC Auto Differential[[id number]]        Abnormal            Final result                 Please view results for these tests on the individual orders.    Comprehensive Metabolic Panel [[id number]]  (Abnormal) Collected:  04/11/18 2301    Specimen:  Blood Updated:  04/11/18 2347     Glucose 210 (H) mg/dL      BUN 8 mg/dL      Creatinine 0.59 mg/dL      Sodium 134 (L) mmol/L      Potassium 3.3 (L) mmol/L      Chloride 94 (L) mmol/L      CO2 20.7 (L) mmol/L      Calcium 8.8 mg/dL      Total Protein 7.5 g/dL      Albumin 3.50  g/dL      ALT (SGPT) 22 U/L      AST (SGOT) 48 (H) U/L      Alkaline Phosphatase 121 (H) U/L      Total Bilirubin 1.1 mg/dL      eGFR Non African Amer 108 mL/min/1.73      Globulin 4.0 gm/dL      A/G Ratio 0.9 g/dL      BUN/Creatinine Ratio 13.6     Anion Gap 19.3 mmol/L     Protime-INR [[id number]]  (Normal) Collected:  04/11/18 2301    Specimen:  Blood Updated:  04/11/18 2326     Protime 13.4 Seconds      INR 1.04    Lipase [[id number]]  (Normal) Collected:  04/11/18 2301    Specimen:  Blood Updated:  04/11/18 2347     Lipase 36 U/L     Troponin [[id number]]  (Normal) Collected:  04/11/18 2301    Specimen:  Blood Updated:  04/11/18 2352     Troponin T <0.010 ng/mL     Narrative:       Troponin T Reference Ranges:  Less than 0.03 ng/mL:    Negative for AMI  0.03 to 0.09 ng/mL:      Indeterminant for AMI  Greater than 0.09 ng/mL: Positive for AMI    Ethanol [[id number]] Collected:  04/11/18 2301    Specimen:  Blood Updated:  04/11/18 2347     Ethanol <10 mg/dL      Ethanol % <0.010 %     Magnesium [[id number]]  (Normal) Collected:  04/11/18 2301    Specimen:  Blood Updated:  04/11/18 2347     Magnesium 1.7 mg/dL     TSH [[id number]]  (Normal) Collected:  04/11/18 2301    Specimen:  Blood Updated:  04/11/18 2352     TSH 1.760 mIU/mL     T4, Free [[id number]]  (Normal) Collected:  04/11/18 2301    Specimen:  Blood Updated:  04/11/18 2352     Free T4 0.99 ng/dL     CK [[id number]]  (Normal) Collected:  04/11/18 2301    Specimen:  Blood Updated:  04/11/18 2347     Creatine Kinase 72 U/L     CBC Auto Differential [[id number]]  (Abnormal) Collected:  04/11/18 2301    Specimen:  Blood Updated:  04/11/18 2317     WBC 12.85 (H) 10*3/mm3      RBC 4.52 10*6/mm3      Hemoglobin 14.8 g/dL      Hematocrit 44.1 %      MCV 97.6 fL      MCH 32.7 (H) pg      MCHC 33.6 g/dL      RDW 13.1 (H) %      RDW-SD 46.5 fl      MPV 12.3 (H) fL      Platelets 150 10*3/mm3      Neutrophil % 55.8 %      Lymphocyte % 37.7 %      Monocyte % 5.3 %       Eosinophil % 0.7 %      Basophil % 0.3 %      Immature Grans % 0.2 %      Neutrophils, Absolute 7.17 10*3/mm3      Lymphocytes, Absolute 4.84 (H) 10*3/mm3      Monocytes, Absolute 0.68 10*3/mm3      Eosinophils, Absolute 0.09 10*3/mm3      Basophils, Absolute 0.04 10*3/mm3      Immature Grans, Absolute 0.03 10*3/mm3     Hemoglobin A1c [[id number]]  (Abnormal) Collected:  04/11/18 2301    Specimen:  Blood Updated:  04/12/18 0315     Hemoglobin A1C 8.80 (H) %     Narrative:       Hemoglobin A1C Ranges:    Increased Risk for Diabetes  5.7% to 6.4%  Diabetes                     >= 6.5%  Diabetic Goal                < 7.0%    Urinalysis With / Microscopic If Indicated - Urine, Clean Catch [[id number]]  (Abnormal) Collected:  04/11/18 2359    Specimen:  Urine from Urine, Catheter Updated:  04/12/18 0009     Color, UA Yellow     Appearance, UA Clear     pH, UA 5.5     Specific Gravity, UA 1.020     Glucose,  mg/dL (2+) (A)     Ketones, UA Negative     Bilirubin, UA Negative     Blood, UA Negative     Protein, UA Negative     Leuk Esterase, UA Negative     Nitrite, UA Negative     Urobilinogen, UA 0.2 E.U./dL    Narrative:       Urine microscopic not indicated.    Urine Drug Screen - Urine, Clean Catch [[id number]]  (Normal) Collected:  04/11/18 2359    Specimen:  Urine from Urine, Catheter Updated:  04/12/18 0032     Amphet/Methamphet, Screen Negative     Barbiturates Screen, Urine Negative     Benzodiazepine Screen, Urine Negative     Cocaine Screen, Urine Negative     Opiate Screen Negative     THC, Screen, Urine Negative     Methadone Screen, Urine Negative     Oxycodone Screen, Urine Negative    Narrative:       Negative Thresholds For Drugs Screened:     Amphetamines               500 ng/ml   Barbiturates               200 ng/ml   Benzodiazepines            100 ng/ml   Cocaine                    300 ng/ml   Methadone                  300 ng/ml   Opiates                    300 ng/ml   Oxycodone                  100  ng/ml   THC                        50 ng/ml    The Normal Value for all drugs tested is negative. This report includes final unconfirmed screening results to be used for medical treatment purposes only. Unconfirmed results must not be used for non-medical purposes such as employment or legal testing. Clinical consideration should be applied to any drug of abuse test, particulary when unconfirmed results are used.    Blood Gas, Arterial [[id number]]  (Abnormal) Collected:  04/12/18 0055    Specimen:  Arterial Blood Updated:  04/12/18 0058     Site Arterial: right brachial     Ramón's Test N/A     pH, Arterial 7.429 pH units      pCO2, Arterial 36.4 mm Hg      pO2, Arterial 60.7 (L) mm Hg      HCO3, Arterial 24.1 mmol/L      Base Excess, Arterial 0.1 mmol/L      O2 Saturation Calculated 91.7 (L) %      A-a Gradiant 0.5 mmHg      Barometric Pressure for Blood Gas 752.2 mmHg      Modality Room Air     FIO2 21 %      Rate 24 Breaths/minute     Narrative:       SATS 90% Meter: [id number] : [id number] Murray Wallace          I ordered the above labs and reviewed the results    RADIOLOGY  CT Head Without Contrast   Preliminary Result   No definite acute intracranial pathology.                      XR Chest 2 View   Preliminary Result   1.  Obliteration of the right cardiophrenic angle may be due to   atelectasis or a large fat pad.   2.  Further evaluation with CT scan is recommended.              MRI Brain With & Without Contrast    (Results Pending)        I ordered the above noted radiological studies. Interpreted by radiologist.  Reviewed by me in PACS.       PROCEDURES  Procedures  EKG    EKG time: 2319  Rhythm/Rate: ST, 109  No Acute Ischemia  Non-Specific ST-T changes    No old for comparison.    Interpreted Contemporaneously by me.  Independently viewed by me      PROGRESS AND CONSULTS  ED Course     8010  Ordered labs, UA, EKG, CXR, and CT head for further evaluation. Ordered solu-medrol and duo-neb  breathing treatments for her wheezing. Ordered kepra for seizure like activity and IV fluids for hydration.    0106  Rechecked Pt who is resting but still  I placed Pt on 2L of O2 due to her low O2 saturation. Her sats improved to 92%.  Informed Pt and Pt's family that her CT head is unremarkable. Discussed plans to admit Pt to the hospital to further evaluate Pt and try to determine the cause of her seizures as well as treating her COPD exacerbation. Pt and Pt's family agrees to all plans. All questions answered.     0110  Placed call to [facility] for admission.     0040  Discussed Pt's case with Dr. Mejia ([facility]) who agrees to admit Pt to neuro-telemetry for further evaluation and treatment.       MEDICAL DECISION MAKING  Results were reviewed/discussed with the patient and they were also made aware of online access. Pt also made aware that some labs, such as cultures, will not be resulted during ER visit and follow up with PMD is necessary.     MDM  Number of Diagnoses or Management Options     Amount and/or Complexity of Data Reviewed  Clinical lab tests: ordered and reviewed (WBC=12.85)  Tests in the radiology section of CPT®: ordered and reviewed (CXR shows nothing acute. CT head shows nothing acute.)  Tests in the medicine section of CPT®: ordered and reviewed (See EKG note.)           DIAGNOSIS  Final diagnoses:   New onset seizure   COPD exacerbation       DISPOSITION  ADMISSION    Discussed treatment plan and reason for admission with pt/family and admitting physician.  Pt/family voiced understanding of the plan for admission for further testing/treatment as needed.     Latest Documented Vital Signs:  As of 3:18 AM  BP- 124/77 HR- 117 Temp- 98 °F (36.7 °C) (Oral) O2 sat- 90%    --  Documentation assistance provided by [employer] Herron for Dr. Mcgregor.  Information recorded by the [employer] was done at my direction and has been verified and validated by me.       Toma Herron  04/12/18 0144       Dillon  MD Meche  04/12/18 2475

## 2018-04-12 NOTE — PROGRESS NOTES
Discharge Planning Assessment  [facility]     Patient Name: Nerissa Oliva  MRN: [id number]  Today's Date: 4/12/2018    Admit Date: 4/11/2018          Discharge Needs Assessment     Row Name 04/12/18 1432       Living Environment    Lives With child(elizabeth), adult;grandchild(elizabeth)    Current Living Arrangements home/apartment/condo       Transition Planning    Patient/Family Anticipates Transition to home with family    Patient/Family Anticipated Services at Transition none    Transportation Anticipated family or friend will provide       Discharge Needs Assessment    Equipment Currently Used at Home none   may need a cane             Discharge Plan     Row Name 04/12/18 1433       Plan    Plan Home. Family to transport. She may need a cane     Plan Comments Patient works full time for [employer]. Face sheet pharmacy and PCP verified. Patient lives with her daughter, Qing and her 1 year old grandson  in a 3rd floor apartment . Patient states to contact  her sister Evi Max --[phone] in case of emergency. . Patient works full time . No HH. No DME, NO rehab. . She says she plans to go home on discharge and may need a cane. CCP to follow........  Ruth Rocha ROYCE        Destination     No service coordination in this encounter.      Durable Medical Equipment     No service coordination in this encounter.      Dialysis/Infusion     No service coordination in this encounter.      Home Medical Care     No service coordination in this encounter.      Social Care     No service coordination in this encounter.                Demographic Summary     Row Name 04/12/18 1431       General Information    Admission Type inpatient   [facility]    Referral Source admission list    Reason for Consult discharge planning    Preferred Language English       Contact Information    Permission Granted to Share Info With             Functional Status     Row Name 04/12/18 1431       Functional Status, IADL     Medications independent    Meal Preparation independent    Housekeeping independent    Laundry independent    Shopping independent       Employment/    Employment Status employed full time   [employer]            Psychosocial    No documentation.           Abuse/Neglect    No documentation.           Legal    No documentation.           Substance Abuse    No documentation.           Patient Forms    No documentation.         MILAGROS Spicer

## 2018-04-12 NOTE — SIGNIFICANT NOTE
04/12/18 1459   Rehab Time/Intention   Evaluation Not Performed patient/family declined evaluation  (Pt and family decline OT eval. States feels baseline function for adls . States PT following for leg weakness and does feel needs OT)   Rehab Treatment   Discipline occupational therapist

## 2018-04-12 NOTE — PROGRESS NOTES
"Clinical Pharmacy Services: Medication History    Nerissa Oliva is a 52 y.o. female presenting to [facility] for COPD exacerbation [J44.1]  New onset seizure [R56.9]    She  has a past medical history of COPD (chronic obstructive pulmonary disease); Diabetes mellitus; Hyperlipidemia; Hypertension; Neuropathy; Rheumatoid arthritis; and Tachycardia.    Allergies as of 04/11/2018   • (No Known Allergies)       Medication information was obtained from: [facility]  Pharmacy and Phone Number: [facility]/pharmacy #[id number] - [facility] [address] [place] [address] RD. AT [facility] - [phone] [facility] [phone] FX    Prior to Admission Medications       Prescriptions Last Dose Informant Patient Reported? Taking?    DULoxetine (CYMBALTA) 60 MG capsule  Pharmacy Yes Yes    Take 60 mg by mouth Daily.    glipiZIDE (GLUCOTROL) 5 MG tablet  Pharmacy Yes Yes    Take 5 mg by mouth Every Morning.    lisinopril (PRINIVIL,ZESTRIL) 20 MG tablet  Pharmacy Yes Yes    Take 20 mg by mouth Daily.    metoprolol succinate XL (TOPROL-XL) 25 MG 24 hr tablet  Pharmacy Yes Yes    Take 25 mg by mouth Daily.    montelukast (SINGULAIR) 10 MG tablet  Pharmacy Yes Yes    Take 10 mg by mouth Every Night.    simvastatin (ZOCOR) 40 MG tablet  Pharmacy Yes Yes    Take 40 mg by mouth Every Night.            Medication notes: The above medications with \"pharmacy\" informant have been verified with [facility]. The following changes have been made since the initial reconciliation:    1) Changed glipizide to 5mg QAM; previously recorded as BID  2) Added Simvastatin 40mg daily  3) Removed gabapentin 600mg QID; not on [facility] report and no record at [facility]    This medication list is complete to the best of my knowledge as of 4/12/2018    Please call if questions.    Adina Scott, PharmD  4/12/2018 10:58 AM      "

## 2018-04-12 NOTE — PAYOR COMM NOTE
"Nerissa Jennings (52 y.o. Female)                  ATTENTION;  NURSE REVIEW, , AUTH PENDING [id number],                REPLY TO UR DEPT, IRAJ RIVERA N  OR UR  [phone] \ NEURO CONSULT                 PENDING       Date of Birth Social Security Number Address Home Phone MRN    1965  [address] [address] [phone] [id number]    Jew Marital Status          None Single       Admission Date Admission Type Admitting Provider Attending Provider Department, Room/Bed    18 Emergency Grady Mejia MD Broaddus, Emmett J., MD [address], 416/1    Discharge Date Discharge Disposition Discharge Destination                       Attending Provider:  Francis Cameron MD    Allergies:  No Known Allergies    Isolation:  None   Infection:  None   Code Status:  FULL    Ht:  162.6 cm (64\")   Wt:  96.6 kg (212 lb 14.4 oz)    Admission Cmt:  None   Principal Problem:  New onset seizure [R56.9]                 Active Insurance as of 2018     Primary Coverage     Payor Plan Insurance Group Employer/Plan Group    [employer] BLUE CROSS [employer] BLUE CROSS BLUE [employer]IELD PPO [id number]     Payor Plan Address Payor Plan Phone Number Effective From Effective To    [id number] [phone] 2018     [place] [id number]       Subscriber Name Subscriber Birth Date Member ID       NERISSA JENNINGS 1965 [id number]                 Emergency Contacts      (Rel.) Home Phone Work Phone Mobile Phone    Julito Jennings (Son) [phone] -- --    Evi Max (Sister) -- -- [phone]               History & Physical      Grady Mejia MD at 2018  2:49 AM              Patient Identification:  Name: Nerissa Jennings  Age/Sex: 52 y.o. female  :  1965  MRN: [id number]         Primary Care Physician: No Known Provider    Chief Complaint   Patient presents with   • Syncope       Subjective   Patient is a 52 y.o. " female with a h/o DM2, HTN, reported tachycardia, COPD who comes in for weakness and seizure-like episodes. She states these have been going on for a year now but are becoming more frequent. Describes this as shaking spells that happen out of the blue. She does not remember these episodes and only remembers waking up afterward. Her son states her arms tighten up and that she shakes. Her eyes will be open but she will not respond. She has had urinary incontinence but no fecal incont. No tongue biting either. States she has been under a lot of stress recently especially with her mother passing recently. She does have chronic cough and sob but is not on oxygen at home. Only uses albuterol inhaler. States that she can get ringing in her ears or blurry vision prior to these episodes happening.     History of Present Illness    Past Medical History:   Diagnosis Date   • COPD (chronic obstructive pulmonary disease)    • Diabetes mellitus    • Hyperlipidemia    • Hypertension    • Neuropathy    • Rheumatoid arthritis    • Tachycardia      Past Surgical History:   Procedure Laterality Date   • BLADDER REPAIR      lift   • CARTILAGE SURGERY Bilateral     knees   • HERNIA REPAIR     • HYSTERECTOMY     • RECTAL PROLAPSE REPAIR       History reviewed. No pertinent family history.  Social History   Substance Use Topics   • Smoking status: Current Every Day Smoker   • Smokeless tobacco: Not on file   • Alcohol use No       (Not in a hospital admission)  Allergies:  Review of patient's allergies indicates no known allergies.    Review of Systems   Constitutional: Positive for fatigue. Negative for chills and fever.   HENT: Negative.    Eyes: Negative.    Respiratory: Positive for cough and shortness of breath.    Cardiovascular: Negative.    Gastrointestinal: Negative.    Endocrine: Negative.    Genitourinary: Negative.    Musculoskeletal: Negative.    Skin: Negative.    Neurological: Positive for seizures, syncope and weakness.  Negative for numbness and headaches.   Hematological: Negative.    Psychiatric/Behavioral: Negative.         Objective    Vital Signs  Temp:  [98.3 °F (36.8 °C)] 98.3 °F (36.8 °C)  Heart Rate:  [100-119] 119  Resp:  [16-18] 16  BP: (103-150)/(53-91) 131/80  Body mass index is 36.05 kg/m².    Physical Exam   Constitutional: She is oriented to person, place, and time. She appears well-developed and well-nourished.   HENT:   Head: Normocephalic and atraumatic.   Mouth/Throat: No oropharyngeal exudate.   Eyes: Conjunctivae are normal. No scleral icterus.   Neck: Normal range of motion. No JVD present. No tracheal deviation present.   Cardiovascular: Regular rhythm.    No murmur heard.  tachy   Pulmonary/Chest: Effort normal.   Decreased breath sounds   Abdominal: Soft. Bowel sounds are normal. She exhibits no distension. There is no tenderness.   Musculoskeletal: She exhibits no edema or deformity.   Neurological: She is alert and oriented to person, place, and time.   Skin: Skin is warm and dry.   Psychiatric: She has a normal mood and affect. Her behavior is normal. Judgment and thought content normal.   Nursing note and vitals reviewed.      Results Review:   I reviewed the patient's new clinical results.  Imaging Results (last 24 hours)     Procedure Component Value Units Date/Time    CT Head Without Contrast [[id number]] Collected:  04/11/18 2336     Updated:  04/11/18 2336    Narrative:       CT SCAN OF THE BRAIN WITHOUT CONTRAST.     TECHNIQUE: Radiation dose reduction techniques were utilized, including  automated exposure control and exposure modulation based on body size.  Multiple axial images of the brain were obtained from the vertex to the  base of the brain.     HISTORY: Seizure.     COMPARISON: No prior studies for comparison.     FINDINGS:   Midline structures are within normal limits, there is no hydrocephalus.  Gray-white matter differentiation is maintained.         Orbits are within normal limits. No  significant mucosal disease of the  para-nasal sinuses. Mastoid air cells are well aerated.              Impression:       No definite acute intracranial pathology.                 XR Chest 2 View [[id number]] Collected:  04/11/18 2323     Updated:  04/11/18 2324    Narrative:       CHEST PA AND LATERAL.     HISTORY: Shortness of breath.     COMPARISON: No prior studies for comparison.     FINDINGS:  Cardiomediastinal silhouette is within normal limits. Right  cardiophrenic angle is obliterated.     There is no consolidation or effusion.              Impression:       1.  Obliteration of the right cardiophrenic angle may be due to  atelectasis or a large fat pad.  2.  Further evaluation with CT scan is recommended.                 Assessment/Plan     Principal Problem:    New onset seizure  Active Problems:    COPD (chronic obstructive pulmonary disease)    Type 2 diabetes mellitus    Hypertension      Assessment & Plan  1. Seizure  - feel this could be pseudoseizures related to stress  - will obtain MRI and EEG to rule out any major abnormalities  - Neuro consult  - cont Keppra  - seizure precautions    2. Leukocytosis  - likely reactive from possible seizure  - monitor for now    3. DM2  - restart glipizide and SSI  - CC diet  - A1c      4. COPD  - she was given solumedrol in ED for possible exacerbation  - I do not hear any wheezing on exam so will continue with just duonebs  - reassess in am and if worsening may need to add back steroids    5. Tachycardia  - restarted metoprolol    6. HTN  - restarted lisinopril      I discussed the patients findings and my recommendations with patient and family.          Grady Mejia MD  [facility]  04/12/18  2:50 AM        Electronically signed by Grady Mejia MD at 4/12/2018  3:03 AM          Emergency Department Notes      Magda Falcon, JENNI at 4/11/2018 10:24 PM        Pt had syncopal episode tonight that was witnessed by family  "then she starts to \"shake\". Per family this has happened over the course of 1 year and the episodes have increased in frequency (q other day). Pt no h/o SZ. Per EMS pt a&o x 4 entire time.     EMS gave albuterol in route.     Electronically signed by Magda Falcon RN at 4/11/2018 10:32 PM     Dillon Mcgregor MD at 4/11/2018 10:37 PM           EMERGENCY DEPARTMENT ENCOUNTER    CHIEF COMPLAINT  Chief Complaint: syncope  History given by: patient  History limited by: nothing  Room Number: 416/1  PMD: No Known Provider      HPI:  Pt is a 52 y.o. female who presents complaining of syncope earlier tonight while Pt was walking. Pt states that she has episodes where she \"shakes really badly\" and then she \"blacks out\" and does not remember what happened. Pt also c/o SOA for the past several days and right sided chest pain under her breast. She states that she does not normally smoke but she has the last couple of days.  Pt also states that she has had increased stress recently due to the recent death of her mother.  Pt has a h/x of COPD, DM, and rheumatoid arthritis  Pt's family is at bedside and they are describing seizure like activity that happens almost every single day. The episodes last for 30-45 seconds. They state that she \"leans over, tightens up, and shakes\". They state that she becomes groggy and it takes her at least 15 minutes for her to get back to her normal level of consciousness. Her family states that tonight's episode was a little different and that she did not become tense and she was less groggy than usual afterwards.     Duration:  Several seconds  Onset: sudden  Timing: constant  Location: n/a  Radiation: n/a  Quality: \"blacking out\"   Intensity/Severity: moderate  Progression: unchanged  Associated Symptoms: grogginess, tremors, SOA, right sided chest pain  Aggravating Factors: none  Alleviating Factors: none  Previous Episodes: Pt has similar episodes almost every day  Treatment before arrival: " none    PAST MEDICAL HISTORY  Active Ambulatory Problems     Diagnosis Date Noted   • No Active Ambulatory Problems     Resolved Ambulatory Problems     Diagnosis Date Noted   • No Resolved Ambulatory Problems     Past Medical History:   Diagnosis Date   • COPD (chronic obstructive pulmonary disease)    • Diabetes mellitus    • Hyperlipidemia    • Hypertension    • Neuropathy    • Rheumatoid arthritis    • Tachycardia        PAST SURGICAL HISTORY  Past Surgical History:   Procedure Laterality Date   • BLADDER REPAIR      lift   • CARTILAGE SURGERY Bilateral     knees   • HERNIA REPAIR     • HYSTERECTOMY     • RECTAL PROLAPSE REPAIR         FAMILY HISTORY  History reviewed. No pertinent family history.    SOCIAL HISTORY  Social History     Social History   • Marital status: Single     Spouse name: N/A   • Number of children: N/A   • Years of education: N/A     Occupational History   • Not on file.     Social History Main Topics   • Smoking status: Current Every Day Smoker   • Smokeless tobacco: Not on file   • Alcohol use No   • Drug use: No   • Sexual activity: Not on file     Other Topics Concern   • Not on file     Social History Narrative   • No narrative on file       ALLERGIES  Review of patient's allergies indicates no known allergies.    REVIEW OF SYSTEMS  Review of Systems   Constitutional: Negative for fever.        Pt becomes groggy and confused after episodes.   HENT: Negative for sore throat.    Eyes: Negative.    Respiratory: Positive for shortness of breath. Negative for cough.    Cardiovascular: Positive for chest pain (right sided).   Gastrointestinal: Negative for abdominal pain, diarrhea and vomiting.   Genitourinary: Negative for dysuria.   Musculoskeletal: Negative for neck pain.   Skin: Negative for rash.   Allergic/Immunologic: Negative.    Neurological: Positive for tremors and syncope. Negative for weakness, numbness and headaches.   Hematological: Negative.    Psychiatric/Behavioral:  Positive for confusion.   All other systems reviewed and are negative.      PHYSICAL EXAM  ED Triage Vitals   Temp Heart Rate Resp BP SpO2   04/11/18 2231 04/11/18 2228 04/11/18 2228 04/11/18 2228 04/11/18 2228   98.3 °F (36.8 °C) 100 18 150/76 95 %      Temp src Heart Rate Source Patient Position BP Location FiO2 (%)   04/11/18 2231 -- -- -- --   Oral           Physical Exam   Constitutional: She is oriented to person, place, and time and well-developed, well-nourished, and in no distress. No distress.   HENT:   Head: Normocephalic and atraumatic.   Mouth/Throat: Mucous membranes are normal.   No tongue trauma   Eyes: EOM are normal. Pupils are equal, round, and reactive to light.   Neck: Normal range of motion. Neck supple.   Cardiovascular: Regular rhythm and normal heart sounds.  Tachycardia present.    Rate 110   Pulmonary/Chest: Effort normal. No respiratory distress. She has wheezes (bilaterally).   Abdominal: Soft. There is no tenderness. There is no rebound and no guarding.   Musculoskeletal: Normal range of motion. She exhibits no edema.   No pedal edema   Neurological: She is alert and oriented to person, place, and time. She has normal sensation and normal strength.   Skin: Skin is warm and dry. No rash noted.   Psychiatric: Mood and affect normal.   Nursing note and vitals reviewed.      LAB RESULTS  Lab Results (last 24 hours)     Procedure Component Value Units Date/Time    CBC & Differential [[id number]] Collected:  04/11/18 2301    Specimen:  Blood Updated:  04/11/18 2317    Narrative:       The following orders were created for panel order CBC & Differential.  Procedure                               Abnormality         Status                     ---------                               -----------         ------                     CBC Auto Differential[[id number]]        Abnormal            Final result                 Please view results for these tests on the individual orders.    Comprehensive  Metabolic Panel [[id number]]  (Abnormal) Collected:  04/11/18 2301    Specimen:  Blood Updated:  04/11/18 2347     Glucose 210 (H) mg/dL      BUN 8 mg/dL      Creatinine 0.59 mg/dL      Sodium 134 (L) mmol/L      Potassium 3.3 (L) mmol/L      Chloride 94 (L) mmol/L      CO2 20.7 (L) mmol/L      Calcium 8.8 mg/dL      Total Protein 7.5 g/dL      Albumin 3.50 g/dL      ALT (SGPT) 22 U/L      AST (SGOT) 48 (H) U/L      Alkaline Phosphatase 121 (H) U/L      Total Bilirubin 1.1 mg/dL      eGFR Non African Amer 108 mL/min/1.73      Globulin 4.0 gm/dL      A/G Ratio 0.9 g/dL      BUN/Creatinine Ratio 13.6     Anion Gap 19.3 mmol/L     Protime-INR [[id number]]  (Normal) Collected:  04/11/18 2301    Specimen:  Blood Updated:  04/11/18 2326     Protime 13.4 Seconds      INR 1.04    Lipase [[id number]]  (Normal) Collected:  04/11/18 2301    Specimen:  Blood Updated:  04/11/18 2347     Lipase 36 U/L     Troponin [[id number]]  (Normal) Collected:  04/11/18 2301    Specimen:  Blood Updated:  04/11/18 2352     Troponin T <0.010 ng/mL     Narrative:       Troponin T Reference Ranges:  Less than 0.03 ng/mL:    Negative for AMI  0.03 to 0.09 ng/mL:      Indeterminant for AMI  Greater than 0.09 ng/mL: Positive for AMI    Ethanol [[id number]] Collected:  04/11/18 2301    Specimen:  Blood Updated:  04/11/18 2347     Ethanol <10 mg/dL      Ethanol % <0.010 %     Magnesium [[id number]]  (Normal) Collected:  04/11/18 2301    Specimen:  Blood Updated:  04/11/18 2347     Magnesium 1.7 mg/dL     TSH [[id number]]  (Normal) Collected:  04/11/18 2301    Specimen:  Blood Updated:  04/11/18 2352     TSH 1.760 mIU/mL     T4, Free [[id number]]  (Normal) Collected:  04/11/18 2301    Specimen:  Blood Updated:  04/11/18 2352     Free T4 0.99 ng/dL     CK [[id number]]  (Normal) Collected:  04/11/18 2301    Specimen:  Blood Updated:  04/11/18 2347     Creatine Kinase 72 U/L     CBC Auto Differential [[id number]]  (Abnormal) Collected:  04/11/18 2301    Specimen:   Blood Updated:  04/11/18 2317     WBC 12.85 (H) 10*3/mm3      RBC 4.52 10*6/mm3      Hemoglobin 14.8 g/dL      Hematocrit 44.1 %      MCV 97.6 fL      MCH 32.7 (H) pg      MCHC 33.6 g/dL      RDW 13.1 (H) %      RDW-SD 46.5 fl      MPV 12.3 (H) fL      Platelets 150 10*3/mm3      Neutrophil % 55.8 %      Lymphocyte % 37.7 %      Monocyte % 5.3 %      Eosinophil % 0.7 %      Basophil % 0.3 %      Immature Grans % 0.2 %      Neutrophils, Absolute 7.17 10*3/mm3      Lymphocytes, Absolute 4.84 (H) 10*3/mm3      Monocytes, Absolute 0.68 10*3/mm3      Eosinophils, Absolute 0.09 10*3/mm3      Basophils, Absolute 0.04 10*3/mm3      Immature Grans, Absolute 0.03 10*3/mm3     Hemoglobin A1c [[id number]]  (Abnormal) Collected:  04/11/18 2301    Specimen:  Blood Updated:  04/12/18 0315     Hemoglobin A1C 8.80 (H) %     Narrative:       Hemoglobin A1C Ranges:    Increased Risk for Diabetes  5.7% to 6.4%  Diabetes                     >= 6.5%  Diabetic Goal                < 7.0%    Urinalysis With / Microscopic If Indicated - Urine, Clean Catch [[id number]]  (Abnormal) Collected:  04/11/18 2359    Specimen:  Urine from Urine, Catheter Updated:  04/12/18 0009     Color, UA Yellow     Appearance, UA Clear     pH, UA 5.5     Specific Gravity, UA 1.020     Glucose,  mg/dL (2+) (A)     Ketones, UA Negative     Bilirubin, UA Negative     Blood, UA Negative     Protein, UA Negative     Leuk Esterase, UA Negative     Nitrite, UA Negative     Urobilinogen, UA 0.2 E.U./dL    Narrative:       Urine microscopic not indicated.    Urine Drug Screen - Urine, Clean Catch [[id number]]  (Normal) Collected:  04/11/18 2359    Specimen:  Urine from Urine, Catheter Updated:  04/12/18 0032     Amphet/Methamphet, Screen Negative     Barbiturates Screen, Urine Negative     Benzodiazepine Screen, Urine Negative     Cocaine Screen, Urine Negative     Opiate Screen Negative     THC, Screen, Urine Negative     Methadone Screen, Urine Negative     Oxycodone  Screen, Urine Negative    Narrative:       Negative Thresholds For Drugs Screened:     Amphetamines               500 ng/ml   Barbiturates               200 ng/ml   Benzodiazepines            100 ng/ml   Cocaine                    300 ng/ml   Methadone                  300 ng/ml   Opiates                    300 ng/ml   Oxycodone                  100 ng/ml   THC                        50 ng/ml    The Normal Value for all drugs tested is negative. This report includes final unconfirmed screening results to be used for medical treatment purposes only. Unconfirmed results must not be used for non-medical purposes such as employment or legal testing. Clinical consideration should be applied to any drug of abuse test, particulary when unconfirmed results are used.    Blood Gas, Arterial [[id number]]  (Abnormal) Collected:  04/12/18 0055    Specimen:  Arterial Blood Updated:  04/12/18 0058     Site Arterial: right brachial     Ramón's Test N/A     pH, Arterial 7.429 pH units      pCO2, Arterial 36.4 mm Hg      pO2, Arterial 60.7 (L) mm Hg      HCO3, Arterial 24.1 mmol/L      Base Excess, Arterial 0.1 mmol/L      O2 Saturation Calculated 91.7 (L) %      A-a Gradiant 0.5 mmHg      Barometric Pressure for Blood Gas 752.2 mmHg      Modality Room Air     FIO2 21 %      Rate 24 Breaths/minute     Narrative:       SATS 90% Meter: [id number] : [id number] Murray Wallace          I ordered the above labs and reviewed the results    RADIOLOGY  CT Head Without Contrast   Preliminary Result   No definite acute intracranial pathology.                      XR Chest 2 View   Preliminary Result   1.  Obliteration of the right cardiophrenic angle may be due to   atelectasis or a large fat pad.   2.  Further evaluation with CT scan is recommended.              MRI Brain With & Without Contrast    (Results Pending)        I ordered the above noted radiological studies. Interpreted by radiologist.  Reviewed by me in PACS.        PROCEDURES  Procedures  EKG    EKG time: 2319  Rhythm/Rate: ST, 109  No Acute Ischemia  Non-Specific ST-T changes    No old for comparison.    Interpreted Contemporaneously by me.  Independently viewed by me      PROGRESS AND CONSULTS  ED Course     2257  Ordered labs, UA, EKG, CXR, and CT head for further evaluation. Ordered solu-medrol and duo-neb breathing treatments for her wheezing. Ordered kepra for seizure like activity and IV fluids for hydration.    0106  Rechecked Pt who is resting but still  I placed Pt on 2L of O2 due to her low O2 saturation. Her sats improved to 92%.  Informed Pt and Pt's family that her CT head is unremarkable. Discussed plans to admit Pt to the hospital to further evaluate Pt and try to determine the cause of her seizures as well as treating her COPD exacerbation. Pt and Pt's family agrees to all plans. All questions answered.     0110  Placed call to A for admission.     0040  Discussed Pt's case with Dr. Mejia ([facility]) who agrees to admit Pt to neuro-telemetry for further evaluation and treatment.       MEDICAL DECISION MAKING  Results were reviewed/discussed with the patient and they were also made aware of online access. Pt also made aware that some labs, such as cultures, will not be resulted during ER visit and follow up with PMD is necessary.     MDM  Number of Diagnoses or Management Options     Amount and/or Complexity of Data Reviewed  Clinical lab tests: ordered and reviewed (WBC=12.85)  Tests in the radiology section of CPT®:  ordered and reviewed (CXR shows nothing acute. CT head shows nothing acute.)  Tests in the medicine section of CPT®:  ordered and reviewed (See EKG note.)           DIAGNOSIS  Final diagnoses:   New onset seizure   COPD exacerbation       DISPOSITION  ADMISSION    Discussed treatment plan and reason for admission with pt/family and admitting physician.  Pt/family voiced understanding of the plan for admission for further testing/treatment as  "needed.     Latest Documented Vital Signs:  As of 3:18 AM  BP- 124/77 HR- 117 Temp- 98 °F (36.7 °C) (Oral) O2 sat- 90%    --  Documentation assistance provided by orquidea Herron for Dr. Mcgregor.  Information recorded by the scribe was done at my direction and has been verified and validated by me.       Toma Herron  04/12/18 0144       Dillon Mcgregor MD  04/12/18 0318      Electronically signed by Dillon Mcgregor MD at 4/12/2018  3:18 AM     Cj Hdz RN at 4/11/2018 10:49 PM        Family reports pt with several episodes in the last year where pt leans forward and then falls the floor while tensing all over and shaking, pt will be unresponsive and \"loopy\" immediately after; tonight family reports pt seemed to pass out while walking, family was able to assist pt to floor, then pt came to.     Cj Hdz RN  04/11/18 7691      Electronically signed by Cj Hdz RN at 4/11/2018 10:51 PM       Lines, Drains & Airways    Active LDAs     Name:   Placement date:   Placement time:   Site:   Days:    Peripheral IV 04/11/18 2349 Left Hand  04/11/18    2349    Hand    less than 1         Inactive LDAs     Name:   Placement date:   Placement time:   Removal date:   Removal time:   Site:   Days:    [REMOVED] Peripheral IV 04/11/18 2226 Left Arm  04/11/18    2226    04/11/18    2345    Arm    less than 1                Hospital Medications (all)       Dose Frequency Start End    aspirin EC tablet 325 mg 325 mg Daily 4/13/2018     Sig - Route: Take 1 tablet by mouth Daily. - Oral    aspirin tablet 325 mg 325 mg Once 4/12/2018 4/12/2018    Sig - Route: Take 1 tablet by mouth 1 (One) Time. - Oral    benzonatate (TESSALON) capsule 200 mg 200 mg 3 Times Daily PRN 4/12/2018     Sig - Route: Take 2 capsules by mouth 3 (Three) Times a Day As Needed for Cough. - Oral    dextrose (D50W) solution 25 g 25 g Every 15 Minutes PRN 4/12/2018     Sig - Route: Infuse 50 mL into a venous catheter Every 15 " (Fifteen) Minutes As Needed for Low Blood Sugar (Blood Sugar Less Than 70). - Intravenous    dextrose (GLUTOSE) oral gel 15 g 15 g Every 15 Minutes PRN 4/12/2018     Sig - Route: Take 15 g by mouth Every 15 (Fifteen) Minutes As Needed for Low Blood Sugar (Blood sugar less than 70). - Oral    gadobenate dimeglumine (MULTIHANCE) injection 19 mL 19 mL Once in Imaging 4/12/2018 4/12/2018    Sig - Route: Infuse 19 mL into a venous catheter Once. - Intravenous    glipiZIDE (GLUCOTROL) tablet 5 mg 5 mg 2 Times Daily Before Meals 4/12/2018     Sig - Route: Take 1 tablet by mouth 2 (Two) Times a Day Before Meals. - Oral    glucagon (human recombinant) (GLUCAGEN DIAGNOSTIC) injection 1 mg 1 mg As Needed 4/12/2018     Sig - Route: Inject 1 mg under the skin As Needed (Blood Glucose Less Than 72). - Subcutaneous    insulin aspart (novoLOG) injection 0-9 Units 0-9 Units 4 Times Daily With Meals & Nightly 4/12/2018     Sig - Route: Inject 0-9 Units under the skin 4 (Four) Times a Day With Meals & at Bedtime. - Subcutaneous    insulin aspart (novoLOG) injection 6 Units 6 Units Once 4/12/2018 4/12/2018    Sig - Route: Inject 6 Units under the skin 1 (One) Time. - Subcutaneous    ipratropium-albuterol (DUO-NEB) nebulizer solution 3 mL 3 mL Once 4/11/2018 4/12/2018    Sig - Route: Take 3 mL by nebulization 1 (One) Time. - Nebulization    ipratropium-albuterol (DUO-NEB) nebulizer solution 3 mL 3 mL Every 6 Hours While Awake - RT 4/12/2018     Sig - Route: Take 3 mL by nebulization Every 6 (Six) Hours While Awake. - Nebulization    levETIRAcetam in NaCl 0.75% (KEPPRA) IVPB 1,000 mg 1,000 mg Once 4/11/2018 4/12/2018    Sig - Route: Infuse 100 mL into a venous catheter 1 (One) Time. - Intravenous    levETIRAcetam in NaCl 0.82% (KEPPRA) IVPB 500 mg 500 mg Every 12 Hours Scheduled 4/12/2018     Sig - Route: Infuse 100 mL into a venous catheter Every 12 (Twelve) Hours. - Intravenous    lisinopril (PRINIVIL,ZESTRIL) tablet 10 mg 10 mg Every  "24 Hours Scheduled 4/12/2018     Sig - Route: Take 1 tablet by mouth Daily. - Oral    methylPREDNISolone sodium succinate (SOLU-Medrol) injection 125 mg 125 mg Once 4/11/2018 4/11/2018    Sig - Route: Infuse 2 mL into a venous catheter 1 (One) Time. - Intravenous    metoprolol tartrate (LOPRESSOR) tablet 12.5 mg 12.5 mg Every 12 Hours Scheduled 4/12/2018     Sig - Route: Take 0.5 tablets by mouth Every 12 (Twelve) Hours. - Oral    potassium chloride (MICRO-K) CR capsule 40 mEq 40 mEq Once 4/12/2018 4/12/2018    Sig - Route: Take 4 capsules by mouth 1 (One) Time. - Oral    sodium chloride 0.9 % bolus 500 mL 500 mL Once 4/11/2018 4/12/2018    Sig - Route: Infuse 500 mL into a venous catheter 1 (One) Time. - Intravenous    sodium chloride 0.9 % flush 1-10 mL 1-10 mL As Needed 4/12/2018     Sig - Route: Infuse 1-10 mL into a venous catheter As Needed for Line Care. - Intravenous    sodium chloride 0.9 % flush 10 mL 10 mL As Needed 4/11/2018     Sig - Route: Infuse 10 mL into a venous catheter As Needed for Line Care. - Intravenous    Linked Group 1:  \"And\" Linked Group Details        sodium chloride 0.9 % infusion 75 mL/hr Continuous 4/12/2018     Sig - Route: Infuse 75 mL/hr into a venous catheter Continuous. - Intravenous    enoxaparin (LOVENOX) syringe 40 mg (Discontinued) 40 mg Every 24 Hours 4/12/2018 4/12/2018    Sig - Route: Inject 0.4 mL under the skin Daily. - Subcutaneous    glipiZIDE (GLUCOTROL) tablet 5 mg (Discontinued) 5 mg Every Morning Before Breakfast 4/12/2018 4/12/2018    Sig - Route: Take 1 tablet by mouth Every Morning Before Breakfast. - Oral          Orders (last 24 hrs)     Start     Ordered    04/13/18 0900  aspirin EC tablet 325 mg  Daily      04/12/18 0916    04/13/18 0600  Lipid Panel  Morning Draw      04/12/18 0916    04/12/18 1423  benzonatate (TESSALON) capsule 200 mg  3 Times Daily PRN      04/12/18 1423    04/12/18 1213  POC Glucose Once  Once      04/12/18 1212    04/12/18 1128  POC " Glucose Once  Once      04/12/18 1127    04/12/18 0945  aspirin tablet 325 mg  Once      04/12/18 0910    04/12/18 0916  PT Consult: Eval & Treat  Once      04/12/18 0916    04/12/18 0916  OT Consult: Eval & Treat CVA  Once      04/12/18 0916    04/12/18 0916  SLP Consult: Eval & Treat  Once      04/12/18 0916    04/12/18 0915  Adult Transthoracic Echo Complete W/ Cont if Necessary Per Protocol  Once      04/12/18 0914    04/12/18 0911  Nursing Swallow Assessment  Once      04/12/18 0912    04/12/18 0900  enoxaparin (LOVENOX) syringe 40 mg  Every 24 Hours,   Status:  Discontinued      04/12/18 0258    04/12/18 0900  lisinopril (PRINIVIL,ZESTRIL) tablet 10 mg  Every 24 Hours Scheduled      04/12/18 0256    04/12/18 0900  Inpatient Diabetes Educator Consult  Once     Provider:  (Not yet assigned)    04/12/18 0334    04/12/18 0800  insulin aspart (novoLOG) injection 0-9 Units  4 Times Daily With Meals & Nightly      04/12/18 0249    04/12/18 0800  gadobenate dimeglumine (MULTIHANCE) injection 19 mL  Once in Imaging      04/12/18 0717    04/12/18 0730  glipiZIDE (GLUCOTROL) tablet 5 mg  Every Morning Before Breakfast,   Status:  Discontinued      04/12/18 0301    04/12/18 0730  glipiZIDE (GLUCOTROL) tablet 5 mg  2 Times Daily Before Meals      04/12/18 0342    04/12/18 0700  ipratropium-albuterol (DUO-NEB) nebulizer solution 3 mL  Every 6 Hours While Awake - RT      04/12/18 0247    04/12/18 0700  levETIRAcetam in NaCl 0.82% (KEPPRA) IVPB 500 mg  Every 12 Hours Scheduled      04/12/18 0247    04/12/18 0700  POC Glucose 4x Daily AC & at Bedtime  4 Times Daily Before Meals & at Bedtime      04/12/18 0249    04/12/18 0610  POC Glucose Once  Once      04/12/18 0609    04/12/18 0600  CBC & Differential  Morning Draw      04/12/18 0257    04/12/18 0600  CBC Auto Differential  PROCEDURE ONCE      04/12/18 0257    04/12/18 0600  Basic Metabolic Panel  Morning Draw      04/12/18 0300    04/12/18 0430  insulin aspart (novoLOG)  injection 6 Units  Once      04/12/18 0349    04/12/18 0400  Vital Signs  Every 4 Hours      04/12/18 0258    04/12/18 0326  POC Glucose Once  Once      04/12/18 0325    04/12/18 0302  potassium chloride (MICRO-K) CR capsule 40 mEq  Once      04/12/18 0300    04/12/18 0301  Gamma GT  STAT      04/12/18 0300    04/12/18 0300  Strict Intake and Output  Every Hour      04/12/18 0258    04/12/18 0259  Weigh Patient  Once      04/12/18 0258    04/12/18 0259  Oxygen Therapy-  Continuous      04/12/18 0258    04/12/18 0259  Insert Peripheral IV  Once      04/12/18 0258    04/12/18 0259  Saline Lock & Maintain IV Access  Continuous      04/12/18 0258    04/12/18 0259  Full Code  Continuous      04/12/18 0258    04/12/18 0259  VTE Risk Assessment - Moderate Risk  Once      04/12/18 0258    04/12/18 0259  Place Sequential Compression Device  Once      04/12/18 0258    04/12/18 0259  Maintain Sequential Compression Device  Continuous      04/12/18 0258    04/12/18 0259  Pulse Oximetry, Continuous  Continuous      04/12/18 0258    04/12/18 0259  Cardiac Monitoring  Continuous      04/12/18 0258    04/12/18 0259  Diet Regular  Diet Effective Now,   Status:  Canceled      04/12/18 0258    04/12/18 0259  Diet Regular; Consistent Carbohydrate  Diet Effective Now      04/12/18 0258    04/12/18 0258  sodium chloride 0.9 % flush 1-10 mL  As Needed      04/12/18 0258    04/12/18 0250  Do NOT Hold Basal Insulin When Patient is NPO, Hold Bolus Dose if NPO  Continuous      04/12/18 0249    04/12/18 0250  Follow [facility] Hypoglycemia Protocol For Blood Glucose Less Than 70 mg/dL  Until Discontinued      04/12/18 0249    04/12/18 0250  Hypoglycemia Treatment - Alert Patient That is Not NPO and Can Safely Swallow  Until Discontinued     Comments:  Administer 4 oz Fruit Juice OR 4 oz Regular Soda OR 8 oz Milk OR 15-30 grams (1 tube) of Glucose Gel.  Recheck Blood Glucose 15 Minutes After Ingestion, Repeat Treatment & Continue to Recheck Blood  Sugar Every 15 Minutes Until Blood Glucose is 70 mg/dL or Higher.  Once Blood Glucose is 70 mg/dL or Higher and if It Will Be more than 60 Minutes Until the Next Meal, Provide Appropriate Snack (Including Carbohydrate Food) Based on Meal Plan Order. Give Meal Tray As Soon As Possible.    04/12/18 0249    04/12/18 0250  Hypoglycemia Treatment - Patient Has IV Access - Unresponsive, NPO or Unable To Safely Swallow  Until Discontinued     Comments:  Administer 25g (50ml) D50W IV Push.  Recheck Blood Glucose 15 Minutes After Administration, if Blood Glucose Remains Less Than 70 mg/dl, Repeat Treatment   Recheck Blood Glucose 15 Minutes After 2nd Administration, if Blood Glucose Remains Less Than 70 mg/dL After 2nd Dose of D50W, Contact Provider for Further Treatment Orders & Consider Adding IVF With D5W for Maintenance    04/12/18 0249 04/12/18 0250  Hypoglycemia Treatment - Patient Without IV Access - Unresponsive, NPO or Unable To Safely Swallow  Until Discontinued     Comments:  Administer 1mg Glucagon SQ & Establish IV Access.  Turn Patient on Side - Nausea / Vomiting May Occur.  Recheck Blood Glucose 15 Minutes After Administration.  If Blood Glucose Remains Less Than 70, Administer 25g D50W IV Push (50ml).  Recheck Blood Glucose 15 Minutes After Administration of D50W, if Blood Glucose Remains Less Than 70 mg/dl, Contact Provider for Further Treatment Orders & Consider Adding IVF With D5 for Maintenance    04/12/18 0249 04/12/18 0250  Notify Provider of event. Communicate needs and document in EMR.  Once      04/12/18 0249    04/12/18 0250  Document event and patients response to treatment in EMR, any medications given to be documented on MAR.  Once      04/12/18 0249    04/12/18 0250  Hemoglobin A1c  Once      04/12/18 0249    04/12/18 0249  dextrose (GLUTOSE) oral gel 15 g  Every 15 Minutes PRN      04/12/18 0249    04/12/18 0249  dextrose (D50W) solution 25 g  Every 15 Minutes PRN      04/12/18 0249     04/12/18 0249  glucagon (human recombinant) (GLUCAGEN DIAGNOSTIC) injection 1 mg  As Needed      04/12/18 0249    04/12/18 0249  sodium chloride 0.9 % infusion  Continuous      04/12/18 0247    04/12/18 0246  metoprolol tartrate (LOPRESSOR) tablet 12.5 mg  Every 12 Hours Scheduled      04/12/18 0244    04/12/18 0246  Inpatient Neurology Consult  Once     Specialty:  Neurology  Provider:  Josemanuel Browne MD    04/12/18 0247    04/12/18 0246  EEG  Once      04/12/18 0247    04/12/18 0246  MRI Brain With & Without Contrast  1 Time Imaging      04/12/18 0247    04/12/18 0144  Inpatient Admission  Once      04/12/18 0143    04/12/18 0111  [facility] (on-call MD unless specified)  Once     Specialty:  Internal Medicine  Provider:  Grady Mejia MD    04/12/18 0110    04/12/18 0058  Blood Gas, Arterial  Once      04/12/18 0057    04/11/18 2259  methylPREDNISolone sodium succinate (SOLU-Medrol) injection 125 mg  Once      04/11/18 2257 04/11/18 2259  ipratropium-albuterol (DUO-NEB) nebulizer solution 3 mL  Once      04/11/18 2257    04/11/18 2259  sodium chloride 0.9 % bolus 500 mL  Once      04/11/18 2257 04/11/18 2259  levETIRAcetam in NaCl 0.75% (KEPPRA) IVPB 1,000 mg  Once      04/11/18 2257    04/11/18 2257  Seizure Precautions  Continuous      04/11/18 2257 04/11/18 2257  Insert peripheral IV  Once      04/11/18 2257 04/11/18 2257  CK  Once      04/11/18 2257 04/11/18 2256  sodium chloride 0.9 % flush 10 mL  As Needed      04/11/18 2257 04/11/18 2256  CBC & Differential  Once      04/11/18 2257    04/11/18 2256  Comprehensive Metabolic Panel  Once      04/11/18 2257    04/11/18 2256  Protime-INR  Once      04/11/18 2257    04/11/18 2256  Lipase  Once      04/11/18 2257 04/11/18 2256  Urinalysis With / Microscopic If Indicated - Urine, Clean Catch  Once      04/11/18 2257    04/11/18 2256  Blood Gas, Arterial  Once      04/11/18 2257 04/11/18 2256  Troponin  Once      04/11/18 2257 04/11/18  2256  Ethanol  Once      04/11/18 2257 04/11/18 2256  Urine Drug Screen - Urine, Clean Catch  Once      04/11/18 2257    04/11/18 2256  Magnesium  Once      04/11/18 2257 04/11/18 2256  TSH  Once      04/11/18 2257 04/11/18 2256  T4, Free  Once      04/11/18 2257    04/11/18 2256  ECG 12 Lead  Once      04/11/18 2257 04/11/18 2256  XR Chest 2 View  1 Time Imaging      04/11/18 2257    04/11/18 2256  CT Head Without Contrast  1 Time Imaging      04/11/18 2257 04/11/18 2256  CBC Auto Differential  PROCEDURE ONCE      04/11/18 2257    Unscheduled  Up With Assistance  As Needed      04/12/18 0258    --  metoprolol succinate XL (TOPROL-XL) 25 MG 24 hr tablet  Daily      04/12/18 0326    --  glipiZIDE (GLUCOTROL) 5 MG tablet  Every Morning      04/12/18 0326    --  DULoxetine (CYMBALTA) 60 MG capsule  Daily      04/12/18 0326    --  lisinopril (PRINIVIL,ZESTRIL) 20 MG tablet  Daily      04/12/18 0326    --  gabapentin (NEURONTIN) 600 MG tablet  4 Times Daily,   Status:  Discontinued      04/12/18 0326    --  montelukast (SINGULAIR) 10 MG tablet  Nightly      04/12/18 0326    --  simvastatin (ZOCOR) 40 MG tablet  Nightly      04/12/18 1057             Plan of Care  Date of Service: 4/12/2018 12:11 PM  Saray Land PT   Physical Therapy      Problem: Patient Care Overview  Goal: Plan of Care Review    04/12/18 1208   OTHER   Outcome Summary Pt admitted 4/11 w/ seizure episode, h/o COPD. Pt reports no other falls, no use of AD, independent w/ dressing/bathing but does not leave apt w/o dtr - 18 steps to enter home. Today pt able to ambulate 150' w/ contact assist, single UE support on IV pole and reaching for weaver rail. Pt reports furniture surfing at baseline - discussed obtaining cane for home, activity/walking recommendations whileinpatient and PT POC.                      Plan of Care  Date of Service: 4/12/2018 1:54 PM  Jennifer Hills MA,CCC-SLP   Speech Therapy      Problem: Patient Care  Overview  Goal: Plan of Care Review  Outcome: Ongoing (interventions implemented as appropriate)    04/12/18 1145   Coping/Psychosocial   Plan of Care Reviewed With patient   OTHER   Outcome Summary SLP completed bedside swallow eval completed. Recommend regular with thins. Meds whole with thin.                        Progress Notes  Date of Service: 4/12/2018 2:43 PM  MILAGROS Spicer   Case Management      []Manual[]Template  []Copied  Discharge Planning Assessment  [facility]     Patient Name: Nerissa Oliva                  MRN: [id number]  Today's Date: 4/12/2018                     Admit Date: 4/11/2018                       Discharge Needs Assessment      Row Name 04/12/18 1432           Living Environment     Lives With child(elizabeth), adult;grandchild(elizabeth)     Current Living Arrangements home/apartment/condo           Transition Planning     Patient/Family Anticipates Transition to home with family     Patient/Family Anticipated Services at Transition none     Transportation Anticipated family or friend will provide           Discharge Needs Assessment     Equipment Currently Used at Home none   may need a cane                               Discharge Plan      Row Name 04/12/18 1433           Plan     Plan Home. Family to transport. She may need a cane      Plan Comments Patient works full time for [employer]. [facility] and PCP verified. Patient lives with her daughter, Qing and her 1 year old grandson  in a 3rd floor apartment . Patient states to contact  her sister Evi Max --[phone] in case of emergency. . Patient works full time . No HH. No DME, NO rehab. . She says she plans to go home on discharge and may need a cane. CCP to follow........  MILAGROS Spicer              Destination      No service coordination in this encounter.               Durable Medical Equipment      No service coordination in this encounter.               Dialysis/Infusion      No service coordination in  this encounter.               Home Medical Care      No service coordination in this encounter.               Social Care      No service coordination in this encounter.                               Demographic Summary      Row Name 04/12/18 1431           General Information     Admission Type inpatient   [facility]     Referral Source admission list     Reason for Consult discharge planning     Preferred Language English           Contact Information     Permission Granted to Share Info With                               Functional Status      Row Name 04/12/18 1431           Functional Status, IADL     Medications independent     Meal Preparation independent     Housekeeping independent     Laundry independent     Shopping independent           Employment/     Employment Status employed full time   [employer]               Psychosocial    No documentation.              Abuse/Neglect    No documentation.              Legal    No documentation.              Substance Abuse    No documentation.              Patient Forms    No documentation.            MILAGROS Spicer

## 2018-04-12 NOTE — THERAPY DISCHARGE NOTE
Acute Care - Speech Language Pathology   Swallow Eval/Discharge [facility]     Patient Name: Nerissa Oliva  : 1965  MRN: [id number]  Today's Date: 2018  Onset of Illness/Injury or Date of Surgery: 18            Admit Date: 2018    Visit Dx:    ICD-10-CM ICD-9-CM   1. New onset seizure R56.9 780.39   2. COPD exacerbation J44.1 491.21     Patient Active Problem List   Diagnosis   • New onset seizure   • COPD (chronic obstructive pulmonary disease)   • Type 2 diabetes mellitus   • Hypertension     Past Medical History:   Diagnosis Date   • COPD (chronic obstructive pulmonary disease)    • Diabetes mellitus    • Hyperlipidemia    • Hypertension    • Neuropathy    • Rheumatoid arthritis    • Tachycardia      Past Surgical History:   Procedure Laterality Date   • BLADDER REPAIR      lift   • CARTILAGE SURGERY Bilateral     knees   • HERNIA REPAIR     • HYSTERECTOMY     • RECTAL PROLAPSE REPAIR            SWALLOW EVALUATION (last 72 hours)      SLP Adult Swallow Evaluation     Row Name 18 1145                   Rehab Evaluation    Document Type evaluation  -OC        Subjective Information no complaints  -OC        Patient Observations alert;cooperative  -OC        Patient Effort good  -OC        Symptoms Noted During/After Treatment none  -OC           General Information    Patient Profile Reviewed yes  -OC        Current Method of Nutrition regular textures;thin liquids  -OC        Precautions/Limitations, Vision WFL  -OC        Precautions/Limitations, Hearing WFL  -OC        Prior Level of Function-Communication WFL  -OC        Prior Level of Function-Swallowing no diet consistency restrictions  -OC        Plans/Goals Discussed with patient and family;agreed upon  -OC        Barriers to Rehab none identified  -OC        Patient's Goals for Discharge patient did not state  -OC        Family Goals for Discharge family did not state  -OC           Pain Scale: Numbers Pre/Post-Treatment     Pain Scale: Numbers, Pretreatment 0/10 - no pain  -OC        Pain Scale: Numbers, Post-Treatment 0/10 - no pain  -OC           Oral Motor and Function    Dentition Assessment natural, present and adequate;poor oral hygiene  -OC        Secretion Management WNL/WFL  -OC        Mucosal Quality moist, healthy  -OC        Volitional Swallow WFL  -OC           Clinical Swallow Eval    Oral Prep Phase WFL  -OC        Oral Transit WFL  -OC        Oral Residue WFL  -OC        Pharyngeal Phase WFL  -OC        Esophageal Phase unremarkable  -OC        Clinical Swallow Evaluation Summary No overt s/s aspiration with thin via cup and straw, puree, mech soft, and regular textures. Prolonged masticaiton with regular, appears functional.  -OC           Clinical Impression    SLP Swallowing Diagnosis functional oral phase;functional pharyngeal phase  -OC        Functional Impact no impact on function  -OC        Rehab Potential/Prognosis, Swallowing good, to achieve stated therapy goals  -OC        Criteria for Skilled Therapeutic Interventions Met no problems identified which require skilled intervention  -OC           Recommendations    Therapy Frequency (Swallow) evaluation only  -OC        SLP Diet Recommendation regular textures;thin liquids  -OC        Recommended Precautions and Strategies upright posture during/after eating;small bites of food and sips of liquid  -OC        SLP Rec. for Method of Medication Administration meds whole;with thin liquids  -OC        Monitor for Signs of Aspiration yes;notify SLP if any concerns  -OC        Anticipated Dischage Disposition unknown  -OC          User Key  (r) = Recorded By, (t) = Taken By, (c) = Cosigned By    Initials Name Effective Dates    OC Jennifer Hills MA,CCC-SLP 04/05/17 -         EDUCATION  The patient has been educated in the following areas:   Dysphagia (Swallowing Impairment).    SLP Recommendation and Plan  SLP Swallowing Diagnosis: functional oral phase, functional  pharyngeal phase  SLP Diet Recommendation: regular textures, thin liquids     Monitor for Signs of Aspiration: yes, notify SLP if any concerns     Criteria for Skilled Therapeutic Interventions Met: no problems identified which require skilled intervention  Anticipated Dischage Disposition: unknown  Rehab Potential/Prognosis, Swallowing: good, to achieve stated therapy goals  Therapy Frequency (Swallow): evaluation only          Plan of Care Reviewed With: patient  Outcome Summary: SLP completed bedside swallow eval completed. Recommend regular with thins. Meds whole with thin.            SLP Outcome Measures (last 72 hours)      SLP Outcome Measures     Row Name 04/12/18 1145             SLP Outcome Measures    Outcome Measure Used? Adult NOMS  -OC         FCM Scores    FCM Chosen Swallowing  -OC      Swallowing FCM Score 7  -OC        User Key  (r) = Recorded By, (t) = Taken By, (c) = Cosigned By    Initials Name Effective Dates    OC Jennifer Hills MA, CCC-SLP 04/05/17 -            Time Calculation:         Time Calculation- SLP     Row Name 04/12/18 1354             Time Calculation- SLP    SLP Start Time 1130  -OC      SLP Received On 04/12/18  -OC        User Key  (r) = Recorded By, (t) = Taken By, (c) = Cosigned By    Initials Name Provider Type    OC Jennifer Hills MA,GRISELDA-SLP Speech and Language Pathologist          Therapy Charges for Today     Code Description Service Date Service Provider Modifiers Qty    [id number] HC ST EVAL ORAL PHARYNG SWALLOW 3 4/12/2018 Jennifer Hills MA,GRISELDA-SLP GN 1               SLP Discharge Summary  Anticipated Dischage Disposition: unknown  Reason for Discharge: all goals and outcomes met, no further needs identified  Progress Toward Achieving Short/long Term Goals: all goals met within established timelines  Discharge Destination: unknown    Jennifer Hills MA, CCC-SLP  4/12/2018

## 2018-04-12 NOTE — THERAPY EVALUATION
Acute Care - Physical Therapy Initial Evaluation  [facility]     Patient Name: Nerissa Oliva  : 1965  MRN: [id number]  Today's Date: 2018   Onset of Illness/Injury or Date of Surgery: 18            Admit Date: 2018    Visit Dx:     ICD-10-CM ICD-9-CM   1. New onset seizure R56.9 780.39   2. COPD exacerbation J44.1 491.21     Patient Active Problem List   Diagnosis   • New onset seizure   • COPD (chronic obstructive pulmonary disease)   • Type 2 diabetes mellitus   • Hypertension     Past Medical History:   Diagnosis Date   • COPD (chronic obstructive pulmonary disease)    • Diabetes mellitus    • Hyperlipidemia    • Hypertension    • Neuropathy    • Rheumatoid arthritis    • Tachycardia      Past Surgical History:   Procedure Laterality Date   • BLADDER REPAIR      lift   • CARTILAGE SURGERY Bilateral     knees   • HERNIA REPAIR     • HYSTERECTOMY     • RECTAL PROLAPSE REPAIR          PT ASSESSMENT (last 12 hours)      Physical Therapy Evaluation     Row Name 18 1202          PT Evaluation Time/Intention    Subjective Information no complaints  -AR     Document Type evaluation  -AR     Mode of Treatment physical therapy  -AR     Patient Effort good  -AR     Symptoms Noted During/After Treatment none  -AR     Row Name 18 1202          General Information    Patient Profile Reviewed? yes  -AR     Onset of Illness/Injury or Date of Surgery 18  -AR     Prior Level of Function independent:   no AD, no other falls, doesn't leave apt w/o dtr, furn. surf  -AR     Equipment Currently Used at Home none  -AR     Existing Precautions/Restrictions fall  -AR     Barriers to Rehab none identified  -AR     Row Name 18 1202          Relationship/Environment    Lives With child(elizabeth), adult  -AR     Row Name 18 1202          Resource/Environmental Concerns    Current Living Arrangements home/apartment/condo  -AR     Row Name 18 1202          Home Main Entrance    Number  of Stairs, Main Entrance other (see comments)   18 steps - third floor apt  -AR     Stairs Comment, Main Entrance 18 steps- 3rd floor apartment  -AR     Row Name 04/12/18 1202          Cognitive Assessment/Intervention- PT/OT    Orientation Status (Cognition) oriented x 4  -AR     Follows Commands (Cognition) WNL  -AR     Row Name 04/12/18 1202          Bed Mobility Assessment/Treatment    Bed Mobility Assessment/Treatment supine-sit;sit-supine  -AR     Supine-Sit [place] (Bed Mobility) conditional independence  -AR     Sit-Supine [place] (Bed Mobility) conditional independence  -AR     Assistive Device (Bed Mobility) head of bed elevated  -AR     Row Name 04/12/18 1202          Transfer Assessment/Treatment    Transfer Assessment/Treatment sit-stand transfer;stand-sit transfer  -AR     Sit-Stand [place] (Transfers) supervision  -AR     Stand-Sit [place] (Transfers) supervision  -AR     Row Name 04/12/18 1202          Gait/Stairs Assessment/Training    [place] Level (Gait) contact guard  -AR     Assistive Device (Gait) --   single UE support on IV pole, reaching for weaver rail  -AR     Distance in Feet (Gait) 150  -AR     Pattern (Gait) swing-through  -AR     Deviations/Abnormal Patterns (Gait) gait speed decreased  -AR     Bilateral Gait Deviations forward flexed posture  -AR     Row Name 04/12/18 1202          General ROM    GENERAL ROM COMMENTS B LE WFL  -AR     Row Name 04/12/18 1202          General Assessment (Manual Muscle Testing)    Comment, General Manual Muscle Testing (MMT) Assessment B LE 4/5  -AR     Row Name 04/12/18 1202          Pain Assessment    Additional Documentation Pain Scale: Numbers Pre/Post-Treatment (Group)  -AR     Row Name 04/12/18 1202          Pain Scale: Numbers Pre/Post-Treatment    Pain Scale: Numbers, Pretreatment 0/10 - no pain  -AR     Pain Scale: Numbers, Post-Treatment 0/10 - no pain  -AR     Row Name 04/12/18 1202          Physical Therapy Clinical  Impression    Patient/Family Goals Statement (PT Clinical Impression) DC home  -AR     Criteria for Skilled Interventions Met (PT Clinical Impression) yes  -AR     Pathology/Pathophysiology Noted (Describe Specifically for Each System) musculoskeletal  -AR     Impairments Found (describe specific impairments) aerobic capacity/endurance  -AR     Rehab Potential (PT Clinical Summary) good, to achieve stated therapy goals  -AR     Row Name 04/12/18 1202          Vital Signs    O2 Delivery Pre Treatment room air  -AR     Row Name 04/12/18 1202          Physical Therapy Goals    Gait Training Goal Selection (PT) gait training, PT goal 1  -AR     Stairs Goal Selection (PT) stairs, PT goal 1  -AR     Additional Documentation Stairs Goal Selection (PT) (Row)  -AR     Row Name 04/12/18 1202          Gait Training Goal 1 (PT)    [place] Level (Gait Training Goal 1, PT) supervision required  -AR     Distance (Gait Goal 1, PT) 150  -AR     Time Frame (Gait Training Goal 1, PT) 1 week  -AR     Row Name 04/12/18 1202          Stairs Goal 1 (PT)    Activity/Assistive Device (Stairs Goal 1, PT) using handrail  -AR     [place] Level/Cues Needed (Stairs Goal 1, PT) contact guard assist  -AR     Number of Stairs (Stairs Goal 1, PT) 10  -AR     Time Frame (Stairs Goal 1, PT) 1 week  -AR     Row Name 04/12/18 1202          Positioning and Restraints    Pre-Treatment Position in bed  -AR     Post Treatment Position --   transport cart  -AR     Row Name 04/12/18 1202          Living Environment    Home Accessibility stairs to enter home  -AR       User Key  (r) = Recorded By, (t) = Taken By, (c) = Cosigned By    Initials Name Provider Type    AR Saraymarkie Land PT Physical Therapist          Physical Therapy Education     Title: PT OT SLP Therapies (Active)     Topic: Physical Therapy (Active)     Point: Mobility training (Active)    Learning Progress Summary     Learner Status Readiness Method Response Comment Documented by     Patient Active Acceptance E NR  AR 04/12/18 1208          Point: Home exercise program (Active)    Learning Progress Summary     Learner Status Readiness Method Response Comment Documented by    Patient Active Acceptance E NR  AR 04/12/18 1208          Point: Body mechanics (Active)    Learning Progress Summary     Learner Status Readiness Method Response Comment Documented by    Patient Active Acceptance E NR  AR 04/12/18 1208          Point: Precautions (Active)    Learning Progress Summary     Learner Status Readiness Method Response Comment Documented by    Patient Active Acceptance E NR  AR 04/12/18 1208                      User Key     Initials Effective Dates Name Provider Type Discipline    AR 04/03/18 -  Saray Land PT Physical Therapist PT                PT Recommendation and Plan  Anticipated Discharge Disposition (PT): home with home health care  Planned Therapy Interventions (PT Eval): balance training, bed mobility training, gait training, home exercise program, patient/family education, ROM (range of motion), stair training, strengthening  Therapy Frequency (PT Clinical Impression): 3 times/wk  Outcome Summary/Treatment Plan (PT)  Anticipated Equipment Needs at Discharge (PT):  (discussed obtaining cane if DC soon)  Anticipated Discharge Disposition (PT): home with home health care  Outcome Summary: Pt admitted 4/11 w/ seizure episode,  h/o COPD.  Pt reports no other falls, no use of AD, independent w/ dressing/bathing but does not leave apt w/o dtr - 18 steps to enter home.  Today pt able to ambulate 150' w/ contact assist, single UE support on IV pole and reaching for weaver rail.  Pt reports furniture surfing at baseline - discussed obtaining cane for home, activity/walking recommendations whileinpatient and PT POC.            Outcome Measures     Row Name 04/12/18 1200             How much help from another person do you currently need...    Turning from your back to your side while in flat  bed without using bedrails? 4  -AR      Moving from lying on back to sitting on the side of a flat bed without bedrails? 4  -AR      Moving to and from a bed to a chair (including a wheelchair)? 4  -AR      Standing up from a chair using your arms (e.g., wheelchair, bedside chair)? 3  -AR      Climbing 3-5 steps with a railing? 3  -AR      To walk in hospital room? 3  -AR      AM-PAC 6 Clicks Score 21  -AR         Functional Assessment    Outcome Measure Options AM-PAC 6 Clicks Basic Mobility (PT)  -AR        User Key  (r) = Recorded By, (t) = Taken By, (c) = Cosigned By    Initials Name Provider Type    AR Saray Land PT Physical Therapist           Time Calculation:         PT Charges     Row Name 04/12/18 1211             Time Calculation    Start Time 1141  -AR      Stop Time 1204  -AR      Time Calculation (min) 23 min  -AR      PT Received On 04/12/18  -AR      PT - Next Appointment 04/13/18  -AR      PT Goal Re-Cert Due Date 04/19/18  -AR        User Key  (r) = Recorded By, (t) = Taken By, (c) = Cosigned By    Initials Name Provider Type    AR Saray Land PT Physical Therapist          Therapy Charges for Today     Code Description Service Date Service Provider Modifiers Qty    60492595908 HC PT EVAL MOD COMPLEXITY 2 4/12/2018 Saray Land, PT GP 1    38201745676 HC PT THER PROC EA 15 MIN 4/12/2018 Saray Land PT GP 1          PT G-Codes  Outcome Measure Options: AM-PAC 6 Clicks Basic Mobility (PT)      Saray Land PT  4/12/2018

## 2018-04-12 NOTE — CONSULTS
DOS: 2018  NAME: Nerissa Oliva   : 1965  PCP: No Known Provider  CC: Stroke  Referring MD: Grady Mejia,*    Neurological Problem and Interval History:  52 y.o. RHW female with a Hx of diabetes mellitus, hyperlipidemia, hypertension and smoking.  The patient was in her usual state of poor health when yesterday around 10:30 PM while walking outside she suddenly lost consciousness and next thing she knows she was on the ground.  According to her family she became pale her arms became tense and that she started to have a fine tremor in her arms and collapsed.  When I mimicked tonic-clonic activity to the sister who witnessed the event she said that her movements were clearly not similar and was a fine tremor of the hands.  She was in her systems words post ictal for about 2-5 minutes and then got back to normal.  Apparently the patient has had 100 such spells for the past year although she has not sought medical attention for them.  Her son thinks it may be panic attacks.  Her sister thinks he may be seizures.  There is no tongue biting with these events.  There is rarely any bladder incontinence.  The spells are occasionally brought on by coughing and always occur while seated but mostly while upright.  She denies any stroke or TIA symptoms except 3-4 months ago she had a brief episode of numbness on the left side that she thinks may have occurred one time before.  She has a history of migraine that has not been having headaches recently.  She is independent and active and works for [employer].  She does have neuropathy from her diabetes.  She smokes one pack per day.    Past Medical/Surgical Hx:  Past Medical History:   Diagnosis Date   • COPD (chronic obstructive pulmonary disease)    • Diabetes mellitus    • Hyperlipidemia    • Hypertension    • Neuropathy    • Rheumatoid arthritis    • Tachycardia      Past Surgical History:   Procedure Laterality Date   • BLADDER REPAIR      lift   • CARTILAGE  SURGERY Bilateral     knees   • HERNIA REPAIR     • HYSTERECTOMY     • RECTAL PROLAPSE REPAIR         Review of Systems:        A complete review of all systems is negative except as described above plus Burning pain in her feet with numbness up to the knees, bad cough from her COPD.    Medications On Admission  Prescriptions Prior to Admission   Medication Sig Dispense Refill Last Dose   • DULoxetine (CYMBALTA) 60 MG capsule Take 60 mg by mouth Daily.      • glipiZIDE (GLUCOTROL) 5 MG tablet Take 5 mg by mouth Every Morning.      • lisinopril (PRINIVIL,ZESTRIL) 20 MG tablet Take 20 mg by mouth Daily.      • metoprolol succinate XL (TOPROL-XL) 25 MG 24 hr tablet Take 25 mg by mouth Daily.      • montelukast (SINGULAIR) 10 MG tablet Take 10 mg by mouth Every Night.      • simvastatin (ZOCOR) 40 MG tablet Take 40 mg by mouth Every Night.          Allergies:  No Known Allergies    Social Hx:  Social History     Social History   • Marital status: Single     Spouse name: N/A   • Number of children: N/A   • Years of education: N/A     Occupational History   • Not on file.     Social History Main Topics   • Smoking status: Current Some Day Smoker     Packs/day: 0.50     Years: 30.00     Types: Cigarettes   • Smokeless tobacco: Not on file   • Alcohol use No      Comment: socially   • Drug use: No   • Sexual activity: Not on file     Other Topics Concern   • Not on file     Social History Narrative   • No narrative on file       Family Hx:  History reviewed. No pertinent family history.    Review of Imaging (Interpretation of images not reports):  MRI brain: DWI is positive for acute stroke in the left lentiform nucleus which measures approximately 6-1/2 mm in diameter, this is visible on FLAIR, flair also shows 2 other distinct white matter lesions as well as a single right parietal cortical lesion which measures about 4.8 mm.,  GRE sequences are negative, post gadolinium images are negative    Laboratory Results:   Lab  "Results   Component Value Date    GLUCOSE 329 (H) 04/12/2018    CALCIUM 8.5 (L) 04/12/2018     04/12/2018    K 3.6 04/12/2018    CO2 22.6 04/12/2018    CL 99 04/12/2018    BUN 8 04/12/2018    CREATININE 0.54 (L) 04/12/2018    EGFRIFNONA 120 04/12/2018    BCR 14.8 04/12/2018    ANIONGAP 15.4 04/12/2018     Lab Results   Component Value Date    WBC 9.80 04/12/2018    HGB 13.5 04/12/2018    HCT 40.8 04/12/2018    MCV 96.5 04/12/2018     (L) 04/12/2018     No results found for: CHOL  No results found for: HDL  No results found for: LDL  No results found for: TRIG  Lab Results   Component Value Date    HGBA1C 8.80 (H) 04/11/2018     Lab Results   Component Value Date    INR 1.04 04/11/2018    PROTIME 13.4 04/11/2018   EKG: Sinus rhythm  TTE: No cardiac shows embolism    Physical Examination:  /64 (BP Location: Right arm, Patient Position: Sitting)   Pulse 114   Temp 98.2 °F (36.8 °C) (Oral)   Resp 20   Ht 162.6 cm (64\")   Wt 96.6 kg (212 lb 14.4 oz)   SpO2 92%   BMI 36.54 kg/m²   General Appearance:   Well developed, Morbidly abdominal obesity, well groomed, alert, and cooperative.  HEENT: Normocephalic.  Normal fundoscopic exam including normal retina, discs are flat with sharp margins, normal vasculature.  Multiple xanthomas medially around the eyes.  Mehul's sign.  Dark, erythematous crusted lesion in the corner right mouth that she blames on a fever blister  Neck and Spine: Normal range of motion.  Normal alignment. No mass or tenderness. No bruits.  Cardiac: Regular rhythm.  Tachycardic.  No murmurs.  Peripheral Vasculature: Radial 2+ and pedal pulses are 1+ equal and symmetric. No signs of distal embolization.  Extremities:    No edema or deformities. Normal joint ROM. BHARGAV hoses and SCD's in place  Skin:    No rashes or birth marks.    Neurological examination:   Higher Integrative  Function: Oriented to time, place and person. Normal registration, recall.  Somewhat of a vague historian. " "Normal attention span and concentration. Normal language including comprehension, spontaneous speech, repetition, reading, writing, naming and vocabulary. No neglect with normal visual-spatial function and construction. Normal fund of knowledge and higher integrative function.  CN II: Pupils are equal, round, and reactive to light. Normal visual acuity and visual fields.    CN III IV VI: Extraocular movements are full without nystagmus.   CN V: Normal facial sensation and strength of muscles of mastication.  CN VII: Facial movements are symmetric. No weakness.  CN VIII:   Auditory acuity is normal.  CN IX & X:   Symmetric palatal movement.  CN XI: Sternocleidomastoid and trapezius are normal.  No weakness.  CN XII:   The tongue is midline.  No atrophy or fasciculations.  Motor: Normal muscle strength, bulk and tone in upper and atrophy of quadriceps otherwise fairly normal muscles of the lower extremities.  No fasciculations, rigidity, spasticity, or abnormal movements.  Reflexes: 1+ in the upper and 1+ at the knees and trace at the ankles. Plantar responses not tested due to patient request.  Sensation: Normal to light touch, temperature, and proprioception in arms and reduced pinprick, temperature and vibration to the knees . Normal graphesthesia and no extinction on DSS.  Station and Gait: Normal gait and station.    Coordination: Finger to nose test shows no dysmetria.  Rapid alternating movements are normal.  Heel to shin normal.  NIHSS: 0    Diagnoses / Discussion:  52 y.o. who presents with Sx of \"seizures\" who has no evidence of central nervous system dysfunction but does have evidence of probable polyneuropathy.  The clinical presentation is unlikely to be due to seizures and may be due to combination of panic attacks, orthostatic hypotension and syncope.  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  Although I believe these are incidental she " does need completion of the stroke workup.  She has multiple vascular risk factors in particular smoking which need to be better controlled.  An EEG is reasonable.     Plan:  Aspirin 325mg  Hydrate  Neurochecks  Check orthostatic vital signs  Check ESR and CRP, lipid panel and hemoglobin A1c  Lipitor 80mg  Non-pharmacological DVT prophylaxis  MRA H & N  GREG if above negative  EKG Tele  PT/OT/ST  Stroke Education  Blood pressure control to <130/80  Goal LDL <70-recommend high dose statins-   Serum glucose < 140  I counseled the patient for 5 minutes on the importance of and tools for smoking cessation   Call 911 for stroke any stroke symptoms    I have discussed the above with the patient and family.  Time spent with patient: 60min    MDM  Reviewed: vitals  Reviewed previous: ultrasound  Interpretation: ECG, MRI and labs        Dictated using Dragon dictation.

## 2018-04-12 NOTE — PLAN OF CARE
Problem: Patient Care Overview  Goal: Plan of Care Review   04/12/18 1822   Coping/Psychosocial   Plan of Care Reviewed With patient;family   OTHER   Outcome Summary Pt alert and oriented, up with assist x1, IVF infusing, BS have been very high glipizide changed from daily to BID, MRI showed stroke, stroke scale performed after call from  and Dr. Browne notified, pt had Echo, EEG and MRI today, on seizure precautions, pt got in shower without consulting staff after transport dropped her off in room this afternoon, walked in on her and educated on need to call for assistance. Gabapentin restarted not at same dose she had been on previously r/t RX not filled since June 2017. MRI ordered for tomorrow r/t MRI today and contrast has to be out of system per MRI dept. this is ok per . Will continue to monitor and follow md orders.        Problem: Skin Injury Risk (Adult)  Goal: Identify Related Risk Factors and Signs and Symptoms  Outcome: Ongoing (interventions implemented as appropriate)   04/12/18 1822   Skin Injury Risk (Adult)   Related Risk Factors (Skin Injury Risk) body weight extremes;mobility impaired     Goal: Skin Health and Integrity  Outcome: Ongoing (interventions implemented as appropriate)   04/12/18 1822   Skin Injury Risk (Adult)   Skin Health and Integrity making progress toward outcome       Problem: Fall Risk (Adult)  Goal: Identify Related Risk Factors and Signs and Symptoms  Outcome: Ongoing (interventions implemented as appropriate)   04/12/18 1822   Fall Risk (Adult)   Related Risk Factors (Fall Risk) fatigue/slow reaction;history of falls;neuro disease/injury;sensory deficits;sleep pattern alteration;slippery/uneven surfaces;environment unfamiliar   Signs and Symptoms (Fall Risk) presence of risk factors     Goal: Absence of Fall  Outcome: Ongoing (interventions implemented as appropriate)   04/12/18 1822   Fall Risk (Adult)   Absence of Fall making progress toward outcome       Problem:  Seizure Disorder/Epilepsy (Adult)  Goal: Signs and Symptoms of Listed Potential Problems Will be Absent, Minimized or Managed (Seizure Disorder/Epilepsy)  Outcome: Ongoing (interventions implemented as appropriate)   04/12/18 8480   Goal/Outcome Evaluation   Problems Assessed (Seizure Disorder/Epilepsy) all   Problems Present (Seizure/Epilepsy) none

## 2018-04-12 NOTE — ED NOTES
"Family reports pt with several episodes in the last year where pt leans forward and then falls the floor while tensing all over and shaking, pt will be unresponsive and \"loopy\" immediately after; tonight family reports pt seemed to pass out while walking, family was able to assist pt to floor, then pt came to.     Cj Hdz RN  04/11/18 2086    "

## 2018-04-12 NOTE — ED TRIAGE NOTES
"Pt had syncopal episode tonight that was witnessed by family then she starts to \"shake\". Per family this has happened over the course of 1 year and the episodes have increased in frequency (q other day). Pt no h/o SZ. Per EMS pt a&o x 4 entire time.     EMS gave albuterol in route.   "

## 2018-04-12 NOTE — PLAN OF CARE
Problem: Patient Care Overview  Goal: Plan of Care Review  Outcome: Ongoing (interventions implemented as appropriate)   04/12/18 1145   Coping/Psychosocial   Plan of Care Reviewed With patient   OTHER   Outcome Summary SLP completed bedside swallow eval completed. Recommend regular with thins. Meds whole with thin.

## 2018-04-13 ENCOUNTER — APPOINTMENT (OUTPATIENT)
Dept: MRI IMAGING | Facility: HOSPITAL | Age: 53
End: 2018-04-13
Attending: RADIOLOGY

## 2018-04-13 LAB
CHOLEST SERPL-MCNC: 175 MG/DL (ref 0–200)
GLUCOSE BLDC GLUCOMTR-MCNC: 109 MG/DL (ref 70–130)
GLUCOSE BLDC GLUCOMTR-MCNC: 137 MG/DL (ref 70–130)
GLUCOSE BLDC GLUCOMTR-MCNC: 144 MG/DL (ref 70–130)
GLUCOSE BLDC GLUCOMTR-MCNC: 151 MG/DL (ref 70–130)
HDLC SERPL-MCNC: 35 MG/DL (ref 40–60)
LDLC SERPL CALC-MCNC: 118 MG/DL (ref 0–100)
LDLC/HDLC SERPL: 3.38 {RATIO}
TRIGL SERPL-MCNC: 109 MG/DL (ref 0–150)
VLDLC SERPL-MCNC: 21.8 MG/DL (ref 5–40)

## 2018-04-13 PROCEDURE — 99233 SBSQ HOSP IP/OBS HIGH 50: CPT | Performed by: NURSE PRACTITIONER

## 2018-04-13 PROCEDURE — 82962 GLUCOSE BLOOD TEST: CPT

## 2018-04-13 PROCEDURE — 70549 MR ANGIOGRAPH NECK W/O&W/DYE: CPT

## 2018-04-13 PROCEDURE — 70544 MR ANGIOGRAPHY HEAD W/O DYE: CPT

## 2018-04-13 PROCEDURE — A9577 INJ MULTIHANCE: HCPCS | Performed by: INTERNAL MEDICINE

## 2018-04-13 PROCEDURE — 80061 LIPID PANEL: CPT | Performed by: HOSPITALIST

## 2018-04-13 PROCEDURE — 97110 THERAPEUTIC EXERCISES: CPT

## 2018-04-13 PROCEDURE — 99253 IP/OBS CNSLTJ NEW/EST LOW 45: CPT | Performed by: NURSE PRACTITIONER

## 2018-04-13 PROCEDURE — 25010000002 LEVETIRACETAM IN NACL 0.82% 500 MG/100ML SOLUTION: Performed by: INTERNAL MEDICINE

## 2018-04-13 PROCEDURE — 63710000001 INSULIN ASPART PER 5 UNITS: Performed by: INTERNAL MEDICINE

## 2018-04-13 PROCEDURE — 94799 UNLISTED PULMONARY SVC/PX: CPT

## 2018-04-13 PROCEDURE — 0 GADOBENATE DIMEGLUMINE 529 MG/ML SOLUTION: Performed by: INTERNAL MEDICINE

## 2018-04-13 RX ORDER — GLIPIZIDE 5 MG/1
5 TABLET ORAL
Status: DISCONTINUED | OUTPATIENT
Start: 2018-04-14 | End: 2018-04-13

## 2018-04-13 RX ORDER — GLIPIZIDE 10 MG/1
10 TABLET ORAL
Status: DISCONTINUED | OUTPATIENT
Start: 2018-04-14 | End: 2018-04-14 | Stop reason: HOSPADM

## 2018-04-13 RX ADMIN — GADOBENATE DIMEGLUMINE 20 ML: 529 INJECTION, SOLUTION INTRAVENOUS at 08:33

## 2018-04-13 RX ADMIN — ATORVASTATIN CALCIUM 40 MG: 20 TABLET, FILM COATED ORAL at 09:28

## 2018-04-13 RX ADMIN — ASPIRIN 325 MG: 325 TABLET, DELAYED RELEASE ORAL at 09:28

## 2018-04-13 RX ADMIN — SODIUM CHLORIDE 75 ML/HR: 9 INJECTION, SOLUTION INTRAVENOUS at 05:59

## 2018-04-13 RX ADMIN — IPRATROPIUM BROMIDE AND ALBUTEROL SULFATE 3 ML: .5; 3 SOLUTION RESPIRATORY (INHALATION) at 10:51

## 2018-04-13 RX ADMIN — LEVETIRACETAM 500 MG: 5 INJECTION INTRAVENOUS at 21:13

## 2018-04-13 RX ADMIN — GABAPENTIN 300 MG: 300 CAPSULE ORAL at 13:03

## 2018-04-13 RX ADMIN — GABAPENTIN 300 MG: 300 CAPSULE ORAL at 21:12

## 2018-04-13 RX ADMIN — METOPROLOL TARTRATE 12.5 MG: 25 TABLET ORAL at 21:12

## 2018-04-13 RX ADMIN — LEVETIRACETAM 500 MG: 5 INJECTION INTRAVENOUS at 09:29

## 2018-04-13 RX ADMIN — IPRATROPIUM BROMIDE AND ALBUTEROL SULFATE 3 ML: .5; 3 SOLUTION RESPIRATORY (INHALATION) at 19:47

## 2018-04-13 RX ADMIN — INSULIN ASPART 2 UNITS: 100 INJECTION, SOLUTION INTRAVENOUS; SUBCUTANEOUS at 11:26

## 2018-04-13 RX ADMIN — METOPROLOL TARTRATE 12.5 MG: 25 TABLET ORAL at 09:29

## 2018-04-13 RX ADMIN — GABAPENTIN 300 MG: 300 CAPSULE ORAL at 05:59

## 2018-04-13 RX ADMIN — LISINOPRIL 10 MG: 10 TABLET ORAL at 09:28

## 2018-04-13 NOTE — PLAN OF CARE
Problem: Patient Care Overview  Goal: Plan of Care Review  Outcome: Ongoing (interventions implemented as appropriate)   04/13/18 3407   Coping/Psychosocial   Plan of Care Reviewed With patient   Plan of Care Review   Progress improving   OTHER   Outcome Summary Pt worked with PT today; no big c/o pain; VSS- tachy at times; IV fluids with IV abx; pt had MRI today of neck and head; Neuro signed off- poss DC     Goal: Individualization and Mutuality  Outcome: Ongoing (interventions implemented as appropriate)    Goal: Discharge Needs Assessment  Outcome: Ongoing (interventions implemented as appropriate)    Goal: Interprofessional Rounds/Family Conf  Outcome: Ongoing (interventions implemented as appropriate)      Problem: Skin Injury Risk (Adult)  Goal: Identify Related Risk Factors and Signs and Symptoms  Outcome: Ongoing (interventions implemented as appropriate)    Goal: Skin Health and Integrity  Outcome: Ongoing (interventions implemented as appropriate)      Problem: Fall Risk (Adult)  Goal: Identify Related Risk Factors and Signs and Symptoms  Outcome: Ongoing (interventions implemented as appropriate)    Goal: Absence of Fall  Outcome: Ongoing (interventions implemented as appropriate)      Problem: Seizure Disorder/Epilepsy (Adult)  Goal: Signs and Symptoms of Listed Potential Problems Will be Absent, Minimized or Managed (Seizure Disorder/Epilepsy)  Outcome: Ongoing (interventions implemented as appropriate)

## 2018-04-13 NOTE — PLAN OF CARE
Problem: Patient Care Overview  Goal: Plan of Care Review  Outcome: Ongoing (interventions implemented as appropriate)   04/13/18 4569   Coping/Psychosocial   Plan of Care Reviewed With patient   Plan of Care Review   Progress improving   OTHER   Outcome Summary vss however slightly tachy, c/o only neuropathy pain, family at bedside, MRI scheduled for tomorrow, neuro checks, iv keppra, ns @75, resting well throughout shift, will continue to monitor

## 2018-04-13 NOTE — THERAPY TREATMENT NOTE
Acute Care - Physical Therapy Treatment Note  [facility]     Patient Name: Nerissa Oliva  : 1965  MRN: [id number]  Today's Date: 2018  Onset of Illness/Injury or Date of Surgery: 18          Admit Date: 2018    Visit Dx:    ICD-10-CM ICD-9-CM   1. New onset seizure R56.9 780.39   2. COPD exacerbation J44.1 491.21     Patient Active Problem List   Diagnosis   • New onset seizure   • COPD (chronic obstructive pulmonary disease)   • Type 2 diabetes mellitus   • Hypertension   • Cerebrovascular accident (CVA) due to thrombosis of cerebral artery       Therapy Treatment          Rehabilitation Treatment Summary     Row Name 18 1000             Treatment Time/Intention    Discipline physical therapy assistant  -CW      Document Type therapy note (daily note)  -CW      Subjective Information no complaints  -CW      Mode of Treatment physical therapy  -CW      Therapy Frequency (PT Clinical Impression) 3 times/wk  -CW      Patient Effort good  -CW      Existing Precautions/Restrictions fall  -CW      Recorded by [CW] Jeffery Bryson, SUSANNE 18 1030 18 1030      Row Name 18 1000             Vital Signs    O2 Delivery Pre Treatment room air  -CW      Recorded by [CW] Jeffery Bryson, SUSANNE 18 1030 18 1030      Row Name 18 1000             Cognitive Assessment/Intervention- PT/OT    Orientation Status (Cognition) oriented x 4  -CW      Follows Commands (Cognition) WNL  -CW      Recorded by [CW] Jeffery Bryson PTA 18 1030 18 1030      Row Name 18 1000             Bed Mobility Assessment/Treatment    Bed Mobility Assessment/Treatment supine-sit;sit-supine  -CW      Supine-Sit [place] (Bed Mobility) conditional independence  -CW      Sit-Supine [place] (Bed Mobility) conditional independence  -CW      Recorded by [CW] Jeffery Bryson, SUSANNE 18 1030 18 1030      Row Name 18 1000             Transfer  Assessment/Treatment    Transfer Assessment/Treatment sit-stand transfer;stand-sit transfer  -CW      Sit-Stand [place] (Transfers) supervision  -CW      Stand-Sit [place] (Transfers) supervision  -CW      Recorded by [CW] Jeffery Bryson, [facility] 04/13/18 1030 04/13/18 1030      Row Name 04/13/18 1000             Gait/Stairs Assessment/Training    [place] Level (Gait) supervision  -CW      Distance in Feet (Gait) 200  -CW      Pattern (Gait) swing-through  -CW      Negotiation (Stairs) handrail location;number of steps;ascending technique;descending technique  -CW      [place] Level (Stairs) supervision  -CW      Handrail Location (Stairs) right side (ascending)  -CW      Number of Steps (Stairs) 8  -CW      Ascending Technique (Stairs) step-over-step  -CW      Descending Technique (Stairs) step-to-step  -CW      Recorded by [CW] Jeffery Bryson, [facility] 04/13/18 1030 04/13/18 1030      Row Name 04/13/18 1000             Positioning and Restraints    Pre-Treatment Position in bed  -CW      Post Treatment Position bed  -CW      In Bed notified nsg;fowlers;call light within reach;encouraged to call for assist;exit alarm on;with family/caregiver  -CW      Recorded by [CW] Jeffery Bryson, [facility] 04/13/18 1030 04/13/18 1030      Row Name 04/13/18 1000             Pain Assessment    Additional Documentation Pain Scale: Numbers Pre/Post-Treatment (Group)  -CW      Recorded by [] Jeffery Bryson, [facility] 04/13/18 1030 04/13/18 1030      Row Name 04/13/18 1000             Pain Scale: Numbers Pre/Post-Treatment    Pain Scale: Numbers, Pretreatment 0/10 - no pain  -CW      Recorded by [CW] Jeffery Bryson, [facility] 04/13/18 1030 04/13/18 1030      Row Name 04/13/18 1000             Outcome Summary/Treatment Plan (PT)    Anticipated Discharge Disposition (PT) home with home health care  -CW      Recorded by [] Jeffery Bryson, [facility] 04/13/18 1030 04/13/18 1030        User Key  (r) = Recorded By, (t) =  Taken By, (c) = Cosigned By    Initials Name Effective Dates Discipline    CW Jeffery Bryson PTA 03/07/18 -  PT                     Physical Therapy Education     Title: PT OT SLP Therapies (Done)     Topic: Physical Therapy (Done)     Point: Mobility training (Done)    Learning Progress Summary     Learner Status Readiness Method Response Comment Documented by    Patient Done Acceptance E,ROXANNE WILLETT,PAULINA   04/13/18 1030     Active Acceptance E NR  AR 04/12/18 1208          Point: Home exercise program (Done)    Learning Progress Summary     Learner Status Readiness Method Response Comment Documented by    Patient Done Acceptance E,TB TAMIE,PAULINA   04/13/18 1030     Active Acceptance E NR  AR 04/12/18 1208          Point: Body mechanics (Done)    Learning Progress Summary     Learner Status Readiness Method Response Comment Documented by    Patient Done Acceptance E,ROXANNE WILLETT,PAULINA   04/13/18 1030     Active Acceptance E NR  AR 04/12/18 1208          Point: Precautions (Done)    Learning Progress Summary     Learner Status Readiness Method Response Comment Documented by    Patient Done Acceptance E,ROXANNE WILLETT,PAULINA   04/13/18 1030     Active Acceptance E NR  AR 04/12/18 1208                      User Key     Initials Effective Dates Name Provider Type Discipline    AR 04/03/18 -  Saray Land, PT Physical Therapist PT     03/07/18 -  Jeffery Bryson, PTA Physical Therapy Assistant PT                    PT Recommendation and Plan  Anticipated Discharge Disposition (PT): home with home health care  Therapy Frequency (PT Clinical Impression): 3 times/wk  Outcome Summary/Treatment Plan (PT)  Anticipated Discharge Disposition (PT): home with home health care  Plan of Care Reviewed With: patient  Progress: improving  Outcome Summary: Pt increasing with strength and balance with no AD and improved amb distance safety and stair navigation          Outcome Measures     Row Name 04/13/18 1000 04/12/18 1200          How much help  from another person do you currently need...    Turning from your back to your side while in flat bed without using bedrails? 4  -CW 4  -AR     Moving from lying on back to sitting on the side of a flat bed without bedrails? 4  -CW 4  -AR     Moving to and from a bed to a chair (including a wheelchair)? 4  -CW 4  -AR     Standing up from a chair using your arms (e.g., wheelchair, bedside chair)? 4  -CW 3  -AR     Climbing 3-5 steps with a railing? 3  -CW 3  -AR     To walk in hospital room? 3  -CW 3  -AR     AM-PAC 6 Clicks Score 22  -CW 21  -AR        Functional Assessment    Outcome Measure Options AM-PAC 6 Clicks Basic Mobility (PT)  -CW AM-PAC 6 Clicks Basic Mobility (PT)  -AR       User Key  (r) = Recorded By, (t) = Taken By, (c) = Cosigned By    Initials Name Provider Type    AR Saray Land, PT Physical Therapist    CW Jeffery Bryson PTA Physical Therapy Assistant           Time Calculation:         PT Charges     Row Name 04/13/18 1031             Time Calculation    Start Time 1015  -CW      Stop Time 1031  -CW      Time Calculation (min) 16 min  -CW      PT Received On 04/13/18  -CW      PT - Next Appointment 04/16/18  -CW        User Key  (r) = Recorded By, (t) = Taken By, (c) = Cosigned By    Initials Name Provider Type    CW Jeffery Bryson PTA Physical Therapy Assistant          Therapy Charges for Today     Code Description Service Date Service Provider Modifiers Qty    04891695853 HC PT THER PROC EA 15 MIN 4/13/2018 Jeffery Bryson PTA GP 1    11584436138 HC PT THER SUPP EA 15 MIN 4/13/2018 Jeffery Bryson PTA GP 1          PT G-Codes  Outcome Measure Options: AM-PAC 6 Clicks Basic Mobility (PT)    Jeffery Bryson PTA  4/13/2018

## 2018-04-13 NOTE — CONSULTS
"Patient Name: Nerissa Oliva  :1965  52 y.o.    Date of Admission: 2018  Encounter Provider: THEO Larios  Date of Encounter Visit: 18  Place of Service: [facility] CARDIOLOGY  Referring Provider: Grady Mejia, *  Patient Care Team:  No Known Provider as PCP - General      Chief complaint: CVA    Reason for Consult:  Need for GREG    History of Present Illness:    Ms Oliva is a 52 year old patient with a history of COPD, hypertension, hyperlipidemia, diabetes, and rheumatoid arthritis.  She presented to the ED after a syncopal episode while walking.  She states she has episodes where she shakes and \"blacks out\" but doesn't remember what happened.  Her family think she might be having seizure activity and these occur about every three weeks She also complained of SOA and right sided chest pain.  She has been under a lot of stress recently due to the death of her mother. She is a current smoker.     Her labs on arrival were BUN/Creat 8/0.59, K 3.3, troponin negative, Mg 1.7, TSH 1.76, WBC 12.85, Hgb 14.8.  CT of head showed nothing acute. She was given Keppra for possible seizure activity and breathing treatments for her wheezing. EEG was obtained and was negative for seizure. She was admitted for further evaluation.  MRI showed an acute subcortical stroke as well as evidence of an old cortical stroke on the right. Neuro was consulted.  ECHO has been done that showed EF 59% with diastolic dysfunction.  MRSA of neck showed no significant stenosis of the carotid or vertebral arteries.     We have been consulted for possible GREG and/or long term cardiac monitor per neuro.     Patient reports she has been having these \"episodes\" for almost a year. She also noted left arm numbness that went away rather quickly. She has a family history of coronary artery disease with both parents having myocardial infarctions in their 60's. She is a current smoker ( " ppd). She has DM and her Hgb A1C is elevated (8.8).     She has a history of COPD and does complain of chest tightness and shortness of breath with exertion and improves with rest. This has been going on for quite sometime and she attributed it to her chronic lung disease. She is not currently experiencing any chest discomfort.       Previous Cardiac Testing  ECHO 04/12/2018  · Left ventricular systolic function is normal. Estimated EF = 59%.  · Left ventricular diastolic dysfunction (grade I) consistent with impaired relaxation.   · No evidence of a patent foramen ovale. No evidence of an atrial septal defect present. . Saline test results are negative.     Past Medical History:   Diagnosis Date   • COPD (chronic obstructive pulmonary disease)    • Diabetes mellitus    • Hyperlipidemia    • Hypertension    • Neuropathy    • Rheumatoid arthritis    • Tachycardia        Past Surgical History:   Procedure Laterality Date   • BLADDER REPAIR      lift   • CARTILAGE SURGERY Bilateral     knees   • HERNIA REPAIR     • HYSTERECTOMY     • RECTAL PROLAPSE REPAIR           Prior to Admission medications    Medication Sig Start Date End Date Taking? Authorizing Provider   DULoxetine (CYMBALTA) 60 MG capsule Take 60 mg by mouth Daily.   Yes Historical Provider, MD   glipiZIDE (GLUCOTROL) 5 MG tablet Take 5 mg by mouth Every Morning.   Yes Historical Provider, MD   lisinopril (PRINIVIL,ZESTRIL) 20 MG tablet Take 20 mg by mouth Daily.   Yes Historical Provider, MD   metoprolol succinate XL (TOPROL-XL) 25 MG 24 hr tablet Take 25 mg by mouth Daily.   Yes Historical Provider, MD   montelukast (SINGULAIR) 10 MG tablet Take 10 mg by mouth Every Night.   Yes Historical Provider, MD   simvastatin (ZOCOR) 40 MG tablet Take 40 mg by mouth Every Night.   Yes Historical Provider, MD       No Known Allergies    Social History     Social History   • Marital status: Single     Social History Main Topics   • Smoking status: Current Some Day  Smoker     Packs/day: 0.50     Years: 30.00     Types: Cigarettes      Comment: Quit Now [place] information provided    • Alcohol use No      Comment: socially   • Drug use: No     Other Topics Concern   • Not on file       History reviewed. No pertinent family history.    REVIEW OF SYSTEMS:   All other systems reviewed and negative.     Constitutional: She is oriented to person, place, and time. Obese. She does not appear ill. poor dentition  HENT:   Head: Normocephalic and atraumatic. Head is without contusion.   Right Ear: Hearing normal. No drainage.   Left Ear: Hearing normal. No drainage.   Nose: No nasal deformity. No epistaxis.   Eyes: Lids are normal. Right eye exhibits no exudate. Left eye exhibits no exudate.  Neck: No JVD present. Carotid bruit is not present. No tracheal deviation present. No thyroid mass and no thyromegaly present.   Cardiovascular: Normal rate, regular rhythm and normal heart sounds.    Pulses:       Posterior tibial pulses are 2+ on the right side, and 2+ on the left side.   Pulmonary/Chest: Effort normal and breath sounds normal.   Abdominal: Soft. Normal appearance and bowel sounds are normal. There is no tenderness.   Musculoskeletal: Normal range of motion.        Right shoulder: She exhibits no deformity.        Left shoulder: She exhibits no deformity.   Neurological: She is alert and oriented to person, place, and time. She has normal strength.   Skin: Skin is warm, dry and intact. No rash noted.   Psychiatric: She has a normal mood and affect. Her behavior is normal. Thought content normal.   Vitals reviewed         Objective:     Vitals:    04/13/18 0710 04/13/18 0849 04/13/18 1051 04/13/18 1341   BP:  123/84  111/73   BP Location:  Left arm  Right arm   Patient Position:  Sitting  Lying   Pulse: 95 100 96 99   Resp:  18 18 18   Temp:  97.9 °F (36.6 °C)  97.8 °F (36.6 °C)   TempSrc:  Oral  Oral   SpO2: 92% 97% 95% 95%   Weight:       Height:         Body mass index is 36.54  kg/m².    Intake/Output Summary (Last 24 hours) at 04/13/18 1733  Last data filed at 04/12/18 1738   Gross per 24 hour   Intake              240 ml   Output                0 ml   Net              240 ml               Lab Review:       Results from last 7 days  Lab Units 04/12/18  0325 04/11/18  2301   SODIUM mmol/L 137 134*   POTASSIUM mmol/L 3.6 3.3*   CHLORIDE mmol/L 99 94*   CO2 mmol/L 22.6 20.7*   BUN mg/dL 8 8   CREATININE mg/dL 0.54* 0.59   CALCIUM mg/dL 8.5* 8.8   BILIRUBIN mg/dL  --  1.1   ALK PHOS U/L  --  121*   ALT (SGPT) U/L  --  22   AST (SGOT) U/L  --  48*   GLUCOSE mg/dL 329* 210*       Results from last 7 days  Lab Units 04/11/18  2301   CK TOTAL U/L 72   TROPONIN T ng/mL <0.010       Results from last 7 days  Lab Units 04/12/18  0325   WBC 10*3/mm3 9.80   HEMOGLOBIN g/dL 13.5   HEMATOCRIT % 40.8   PLATELETS 10*3/mm3 125*       Results from last 7 days  Lab Units 04/11/18  2301   INR  1.04       Results from last 7 days  Lab Units 04/11/18  2301   MAGNESIUM mg/dL 1.7       Results from last 7 days  Lab Units 04/13/18  0353   CHOLESTEROL mg/dL 175   TRIGLYCERIDES mg/dL 109   HDL CHOL mg/dL 35*   LDL CHOL mg/dL 118*     MRI/MRA head and neck  IMPRESSION:     The study is prominently compromised by motion artifact. Otherwise, the  common carotid artery bifurcations and proximal internal carotids have  no NASCET significant stenosis.     The vertebral artery origins are not well evaluated. Otherwise, the  remaining cervical segments of the vertebrals are unremarkable.         CT head  IMPRESSION:  No definite acute intracranial pathology    CXR  IMPRESSION:  1.  Obliteration of the right cardiophrenic angle may be due to  atelectasis or a large fat pad.  2.  Further evaluation with CT scan is recommended    Telemetry      EKG      I personally viewed and interpreted the patient's EKG/Telemetry data.        Assessment and Plan:   1. Acute subcortical stroke as well as old infarct noted - Echocardiogram  was unremarkable. MRA of neck with no evidence of significant stenosis of the carotid or vertebral arteries. She has been placed on ASA and atorvastatin.   2. HTN - stable on home meds  3. DM - Hgb A1C 8.8  4. Tobacco abuse - cessation encouraged.   5. Stable angina - she describes symptoms concerning for angina. Due to body habitus and obesity, PET stress would be ideal and can be done as an outpatient. If she needs to stay over the weekend, could consider 2 day stress while she is here.    I have reached out to neurology to see if GREG should be done as an inpatient. If so, it will have to be done on Monday and we can do stress over weekend. If it can be done as an outpatient, we will arrange for GREG and PET stress at our office.     Mariam Paige, APRN  04/13/18  5:33 PM

## 2018-04-13 NOTE — PROGRESS NOTES
"DOS: 2018  NAME: Nerissa Oliva   : 1965  PCP: No Known Provider  Chief Complaint   Patient presents with   • Syncope     Stroke  Historian: Patient and Nursing     Subjective: No events overnight.  Patient denies HA, double/blurred vision, dizziness, numbness or loss of sensation or any other new neurological complaints at this time.       Objective:  Vital signs: /73 (BP Location: Right arm, Patient Position: Lying)   Pulse 99   Temp 97.8 °F (36.6 °C) (Oral)   Resp 18   Ht 162.6 cm (64\")   Wt 96.6 kg (212 lb 14.4 oz)   SpO2 95%   BMI 36.54 kg/m²       HEENT: Normocephalic, atraumatic   COR: RRR  Resp: Even and unlabored  Extremities: Equal pulses, non distal embolization    Neurological:   MS: AO. Language normal. No neglect. Higher integrative function normal  CN: II-XII normal  Motor: 5/5, normal tone  Sensory: Intact  Coordination: Normal  NIHSS 0     Laboratory results:  Lab Results   Component Value Date    GLUCOSE 329 (H) 2018    CALCIUM 8.5 (L) 2018     2018    K 3.6 2018    CO2 22.6 2018    CL 99 2018    BUN 8 2018    CREATININE 0.54 (L) 2018    EGFRIFNONA 120 2018    BCR 14.8 2018    ANIONGAP 15.4 2018     Lab Results   Component Value Date    WBC 9.80 2018    HGB 13.5 2018    HCT 40.8 2018    MCV 96.5 2018     (L) 2018     Lab Results   Component Value Date    CHOL 175 2018     Lab Results   Component Value Date    HDL 35 (L) 2018     Lab Results   Component Value Date     (H) 2018     Lab Results   Component Value Date    TRIG 109 2018     Lab Results   Component Value Date    TSH 1.760 2018     No results found for: [id number]  Lab Results   Component Value Date    HGBA1C 8.80 (H) 2018     Lab Results   Component Value Date    CHOL 175 2018    TRIG 109 2018    HDL 35 (L) 2018     (H) 2018     ESR " 34  CRP 1.36    Review and interpretation of imaging per Dr. Huynh:  MRI brain: DWI is positive for acute stroke in the left lentiform nucleus which measures approximately 6-1/2 mm in diameter, this is visible on FLAIR, flair also shows 2 other distinct white matter lesions as well as a single right parietal cortical lesion which measures about 4.8 mm.,  GRE sequences are negative, post gadolinium images are negative    EEG   Impression:  Normal EEG during wakefulness and stage II sleep.  2 clinical events were not accompanied by any electrographic seizure activity    TTE     Interpretation Summary     · Left ventricular systolic function is normal. Estimated EF = 59%.  · Left ventricular diastolic dysfunction (grade I) consistent with impaired relaxation.       MRA Head and Neck no obvious carotid stenosis, MRA [place] no obvious stenosis; images of poor quality    Impression:This is a 53 yo female who presented [facility] with complaint of sudden LOC. Family reported that she became pale  and her arms became tense and that she started to have a fine tremor in her arms and then collapsed. MRI revealed acute subcortical stroke as well as evidence of an old cortical stroke on the right. Based on presentation noted above an EEG was obtained to exclude the possibility of seizure. EEG was normal. TTE Unremarkable. MRA Head and Neck unremarkable. Will consult cardiology to evaluate need for TTE if negative and/or not indicated we will plan for LT (Cardio Key ) @ discharge. PT/OT/ST. CCP to assist with discharge planning.         Plan:  EEG (results above)   Smoking Cessation   Ready to quit: No  Counseling given: Yes  Cardiology Consult for GREG  · If negative and/of not indicated will plan for LT Cardiac Monitor at D/C (cardiokey)   Aspirin 325mg  Hydrate  Neurochecks  Check ESR and CRP, lipid panel and hemoglobin A1c (results above)   Lipitor 80mg  Non-pharmacological DVT prophylaxis  MRA H & N (pending)    EKG Tele  PT/OT/ST  Stroke Education  Blood pressure control to <130/80  Goal LDL <70-recommend high dose statins-             Serum glucose < 140    All imaging and labs reviewed with Dr. Huynh  and he agrees with radiology impressions and plan.

## 2018-04-13 NOTE — PLAN OF CARE
Problem: Patient Care Overview  Goal: Plan of Care Review  Outcome: Ongoing (interventions implemented as appropriate)   04/13/18 1030   Coping/Psychosocial   Plan of Care Reviewed With patient   Plan of Care Review   Progress improving   OTHER   Outcome Summary Pt increasing with strength and balance with no AD and improved amb distance safety and stair navigation

## 2018-04-13 NOTE — NURSING NOTE
Stroke booklet provided to patient. Discussed pages 12 and 13 specifically emphasizing all personal modifiable risk factors for stroke. Discussed importance of smoking cessation and provided Quit Now [place] resources. Patient states she had been trying to quit but then her mother  recently and she began smoking heavily again under the increased stress. Patient states she very much wants to quit.  Also discussed stroke medications, signs and symptoms of stroke and importance of calling 911/seeking immediate medical attention for any signs/symptoms of stroke. All questions answered. Patient states she is very happy with the quality of care she is receiving.

## 2018-04-13 NOTE — PROGRESS NOTES
Name: Nerissa Oliva ADMIT: 2018   : 1965  PCP: No Known Provider    MRN: [id number] LOS: 1 days   AGE/SEX: 52 y.o. female  ROOM: [facility]/   Subjective   Notes having spells of blacking out.  She calls them seizures for about 1 year.  For started once every 3 weeks now this past week she had 15.  Even one episode while driving.  Denies any prodrome and when she wakes up she is sluggish and still shaking.  Some episodes occur after coughing episode and denies any shortness breath, cough, chest pain, palpitations or any symptoms at the current time.  She currently smokes and does not have oxygen at home    Objective   Vital Signs  Temp:  [97.3 °F (36.3 °C)-98.2 °F (36.8 °C)] 97.9 °F (36.6 °C)  Heart Rate:  [] 100  Resp:  [16-20] 18  BP: (102-123)/(64-84) 123/84  SpO2:  [85 %-97 %] 97 %  on  Flow (L/min):  [1-2] 2;   Device (Oxygen Therapy): room air  Body mass index is 36.54 kg/m².    Physical Exam   Constitutional: She is oriented to person, place, and time. No distress.   Cardiovascular: Normal rate and regular rhythm.  Exam reveals no friction rub.    No murmur heard.  Pulmonary/Chest: Effort normal. No respiratory distress. She has rales (Cleared somewhat with coughing).   Abdominal: Soft. Bowel sounds are normal. She exhibits no distension. There is no tenderness.   Musculoskeletal: She exhibits no edema or tenderness.   Neurological: She is alert and oriented to person, place, and time. No cranial nerve deficit.   Skin: She is not diaphoretic. No erythema. No pallor.   Psychiatric: She has a normal mood and affect. Her behavior is normal.       Results Review:       I reviewed the patient's new clinical results.    Results from last 7 days  Lab Units 18  0325 18  2301   WBC 10*3/mm3 9.80 12.85*   HEMOGLOBIN g/dL 13.5 14.8   PLATELETS 10*3/mm3 125* 150     Results from last 7 days  Lab Units 18  0325 04/11/18  2301   SODIUM mmol/L 137 134*   POTASSIUM mmol/L 3.6 3.3*  "  CHLORIDE mmol/L 99 94*   CO2 mmol/L 22.6 20.7*   BUN mg/dL 8 8   CREATININE mg/dL 0.54* 0.59   GLUCOSE mg/dL 329* 210*   Estimated Creatinine Clearance: 140.7 mL/min (by C-G formula based on SCr of 0.54 mg/dL (L)).  Results from last 7 days  Lab Units 04/12/18  0325 04/11/18  2301   CALCIUM mg/dL 8.5* 8.8   ALBUMIN g/dL  --  3.50   MAGNESIUM mg/dL  --  1.7         aspirin 325 mg Oral Daily   atorvastatin 40 mg Oral Daily   gabapentin 300 mg Oral Q8H   [START ON 4/14/2018] glipiZIDE 10 mg Oral QAM AC   insulin aspart 0-9 Units Subcutaneous 4x Daily With Meals & Nightly   ipratropium-albuterol 3 mL Nebulization Q6H While Awake - RT   levETIRAcetam 500 mg Intravenous Q12H   lisinopril 10 mg Oral Q24H   metoprolol tartrate 12.5 mg Oral Q12H       sodium chloride 75 mL/hr Last Rate: 75 mL/hr (04/13/18 0559)   Diet Regular; Consistent Carbohydrate      Assessment/Plan      Active Hospital Problems (** Indicates Principal Problem)    Diagnosis Date Noted   • **Cerebrovascular accident (CVA) due to thrombosis of cerebral artery [I63.30] 04/12/2018   • New onset seizure [R56.9] 04/12/2018   • COPD (chronic obstructive pulmonary disease) [J44.9] 04/12/2018   • Type 2 diabetes mellitus [E11.9] 04/12/2018   • Hypertension [I10] 04/12/2018      Resolved Hospital Problems    Diagnosis Date Noted Date Resolved   No resolved problems to display.       · These \"spells\" could be seizures or syncopal episode.  Will obtain EEG and 2-D echo, orthostatic blood pressures continue Keppra for   · Acute strokes: Aspirin, Lipitor, neuro checks, MRI and MRA, 2-D echo.  Needs to quit smoking.  · Increase her glipizide to 10 mg in the morning for better blood sugar control  · Discontinue IV fluids with good oral intake  · SCDs    Disposition: If stroke workup negative could possibly discharge home tomorrow.    Vladislav Quinteros MD  [facility]  04/13/18  10:08 AM    "

## 2018-04-13 NOTE — PROGRESS NOTES
Discussed with THEO Celaya with neurology. OK to do GREG as an outpatient.     OK to discharge from a cardiac standpoint. Our office will call patient to schedule GREG and PET stress test.     THEO Larios   [facility]  04/13/18  6:35 PM

## 2018-04-13 NOTE — DISCHARGE INSTRUCTIONS
Ideal targets for stroke prevention would be :  Blood pressure < 130/80; B12 > 500 TSH in normal range and LDL < 70; HbA1c < 6.5 and smoking cessation if applicable.

## 2018-04-14 ENCOUNTER — APPOINTMENT (OUTPATIENT)
Dept: CARDIOLOGY | Facility: HOSPITAL | Age: 53
End: 2018-04-14
Attending: PSYCHIATRY & NEUROLOGY

## 2018-04-14 VITALS
HEART RATE: 115 BPM | BODY MASS INDEX: 36.35 KG/M2 | TEMPERATURE: 97.8 F | OXYGEN SATURATION: 97 % | WEIGHT: 212.9 LBS | DIASTOLIC BLOOD PRESSURE: 74 MMHG | RESPIRATION RATE: 18 BRPM | HEIGHT: 64 IN | SYSTOLIC BLOOD PRESSURE: 112 MMHG

## 2018-04-14 PROBLEM — Z72.0 TOBACCO ABUSE: Status: ACTIVE | Noted: 2018-04-14

## 2018-04-14 PROBLEM — R56.9 NEW ONSET SEIZURE (HCC): Status: RESOLVED | Noted: 2018-04-12 | Resolved: 2018-04-14

## 2018-04-14 LAB
GLUCOSE BLDC GLUCOMTR-MCNC: 201 MG/DL (ref 70–130)
GLUCOSE BLDC GLUCOMTR-MCNC: 94 MG/DL (ref 70–130)

## 2018-04-14 PROCEDURE — 82962 GLUCOSE BLOOD TEST: CPT

## 2018-04-14 PROCEDURE — 63710000001 INSULIN ASPART PER 5 UNITS: Performed by: INTERNAL MEDICINE

## 2018-04-14 PROCEDURE — 0296T HC EXT ECG > 48HR TO 21 DAY RCRD W/CONECT INTL RCRD: CPT

## 2018-04-14 PROCEDURE — 94799 UNLISTED PULMONARY SVC/PX: CPT

## 2018-04-14 PROCEDURE — 25010000002 LEVETIRACETAM IN NACL 0.82% 500 MG/100ML SOLUTION: Performed by: INTERNAL MEDICINE

## 2018-04-14 RX ORDER — GLIPIZIDE 10 MG/1
10 TABLET ORAL EVERY MORNING
Qty: 30 TABLET | Refills: 0 | Status: SHIPPED | OUTPATIENT
Start: 2018-04-14

## 2018-04-14 RX ORDER — ATORVASTATIN CALCIUM 40 MG/1
40 TABLET, FILM COATED ORAL DAILY
Qty: 30 TABLET | Refills: 0 | Status: SHIPPED | OUTPATIENT
Start: 2018-04-15 | End: 2020-10-26

## 2018-04-14 RX ADMIN — IPRATROPIUM BROMIDE AND ALBUTEROL SULFATE 3 ML: .5; 3 SOLUTION RESPIRATORY (INHALATION) at 07:22

## 2018-04-14 RX ADMIN — GLIPIZIDE 10 MG: 10 TABLET ORAL at 08:15

## 2018-04-14 RX ADMIN — LISINOPRIL 10 MG: 10 TABLET ORAL at 08:15

## 2018-04-14 RX ADMIN — GABAPENTIN 300 MG: 300 CAPSULE ORAL at 05:16

## 2018-04-14 RX ADMIN — INSULIN ASPART 4 UNITS: 100 INJECTION, SOLUTION INTRAVENOUS; SUBCUTANEOUS at 08:14

## 2018-04-14 RX ADMIN — ATORVASTATIN CALCIUM 40 MG: 20 TABLET, FILM COATED ORAL at 08:15

## 2018-04-14 RX ADMIN — LEVETIRACETAM 500 MG: 5 INJECTION INTRAVENOUS at 08:14

## 2018-04-14 RX ADMIN — ASPIRIN 325 MG: 325 TABLET, DELAYED RELEASE ORAL at 08:15

## 2018-04-14 RX ADMIN — METOPROLOL TARTRATE 12.5 MG: 25 TABLET ORAL at 08:15

## 2018-04-14 NOTE — PLAN OF CARE
Problem: Patient Care Overview  Goal: Plan of Care Review  Outcome: Ongoing (interventions implemented as appropriate)   04/14/18 0546   Coping/Psychosocial   Plan of Care Reviewed With patient   Plan of Care Review   Progress improving   OTHER   Outcome Summary No c/o pain this evening. HR tachy throughout evening. Received antibiotic. MRI results of pt's neck and head negative. Neuro signed off said okay to DC from their standpoint.      Goal: Individualization and Mutuality  Outcome: Ongoing (interventions implemented as appropriate)    Goal: Discharge Needs Assessment  Outcome: Ongoing (interventions implemented as appropriate)    Goal: Interprofessional Rounds/Family Conf  Outcome: Ongoing (interventions implemented as appropriate)      Problem: Skin Injury Risk (Adult)  Goal: Identify Related Risk Factors and Signs and Symptoms  Outcome: Ongoing (interventions implemented as appropriate)    Goal: Skin Health and Integrity  Outcome: Ongoing (interventions implemented as appropriate)      Problem: Fall Risk (Adult)  Goal: Identify Related Risk Factors and Signs and Symptoms  Outcome: Ongoing (interventions implemented as appropriate)    Goal: Absence of Fall  Outcome: Ongoing (interventions implemented as appropriate)      Problem: Seizure Disorder/Epilepsy (Adult)  Goal: Signs and Symptoms of Listed Potential Problems Will be Absent, Minimized or Managed (Seizure Disorder/Epilepsy)  Outcome: Ongoing (interventions implemented as appropriate)

## 2018-04-14 NOTE — DISCHARGE SUMMARY
Name: Nerissa Oliva  Age: 52 y.o.  Sex: female  :  1965  MRN: [id number]         Primary Care Physician: No Known Provider      Date of Admission:  2018  Date of Discharge:  2018      CHIEF COMPLAINT  Syncope      DISCHARGE DIAGNOSIS  Active Hospital Problems (** Indicates Principal Problem)    Diagnosis Date Noted   • **Cerebrovascular accident (CVA) due to thrombosis of cerebral artery [I63.30] 2018   • Tobacco abuse [Z72.0] 2018   • COPD (chronic obstructive pulmonary disease) [J44.9] 2018   • Type 2 diabetes mellitus [E11.9] 2018   • Hypertension [I10] 2018      Resolved Hospital Problems    Diagnosis Date Noted Date Resolved   • New onset seizure [R56.9] 2018       SECONDARY DIAGNOSES  Past Medical History:   Diagnosis Date   • COPD (chronic obstructive pulmonary disease)    • Diabetes mellitus    • Hyperlipidemia    • Hypertension    • Neuropathy    • Rheumatoid arthritis    • Tachycardia        CONSULTS   Consult Orders (all)     Start     Ordered    18 1418  Inpatient Cardiology Consult  Once     Specialty:  Cardiology  Provider:  Yee Khalil MD    18 1417    18 0246  Inpatient Neurology Consult  Once     Specialty:  Neurology  Provider:  Jarocho Huynh MD    18 0247          PROCEDURES PERFORMED    EEG    Impression:  Normal EEG during wakefulness and stage II sleep.  2 clinical events were not accompanied by any electrographic seizure activity.    2D ECHO    Interpretation Summary   · Left ventricular systolic function is normal. Estimated EF = 59%.  · Left ventricular diastolic dysfunction (grade I) consistent with impaired relaxation.     MRA OF THE HEAD WITHOUT CONTRAST AND MRA OF THE NECK WITH AND WITHOUT  CONTRAST      IMPRESSION:     Essentially unremarkable MRA of the head. Again, there is diminished  flow-related enhancement within the post PICA segment of the right  vertebral artery which is  felt to be most likely artifactual in nature  as this portion of the right vertebral artery has a relatively  unremarkable appearance on the MRA of the neck.     IMPRESSION:     The study is prominently compromised by motion artifact. Otherwise, the  common carotid artery bifurcations and proximal internal carotids have  no NASCET significant stenosis.     STAT MRI OF THE BRAIN WITH AND WITHOUT CONTRAST 04/12/2018      IMPRESSION:     1. There is a 6 mm rounded acute lacunar type infarct in the posterior  inferior lateral left putamen extending into the left external capsule.     2. There is a tiny 5 x 3 mm old posterior superior right frontal white  matter infarct and there is an extremely subtle 5 x 2 mm cortical area  of encephalomalacia in the lateral right parietal cortex suggesting a  tiny old lateral right parietal cortical infarct in the right MCA  territory. The remainder of the MRI of the head is normal. The results  of the study were communicated to Dr. Francis Aguayo by telephone on  0412/2018 at 7:50 AM.    CT SCAN OF THE BRAIN WITHOUT CONTRAST.     IMPRESSION:  No definite acute intracranial pathology.        CHEST PA AND LATERAL.      IMPRESSION:  1.  Obliteration of the right cardiophrenic angle may be due to  atelectasis or a large fat pad.  2.  Further evaluation with CT scan is recommended.          HOSPITAL COURSE    The patient is a 52-year-old female with history of type 2 diabetes, hypertension, COPD who came to the hospital for weakness and multiple seizure-like episodes.  She was admitted to our service for further workup.  Her symptoms were not overtly consistent with a seizure episode.  She had full workup including MRI which revealed an acute stroke.  She had complete workup for stroke and neurology was consulted.  They did not believe the small stroke was to blame for her spells.  Cardiology was also consulted and she was recommended to have a zio patch upon discharge.  She will also  have a transesophageal echocardiogram and stress test as an outpatient.  Cardiology office will call her to get this set up.  In regards to the spells they were unlikely to be seizures.  She had EEG and had 2 clinical episodes during the EEG.  There is no epileptiform activity during these spells.  Her imaging results and studies are above.  Her Keppra was started on admission was discontinued.  She'll be discharged with statin and antiplatelet.  She'll follow-up with Bridget Pearl in 3 months at the cardiology office will schedule the stress test and transesophageal echocardiogram.  The patient is medically stable for discharge today.  She is in agreement with discharge plan today.  She was instructed on tobacco cessation and close follow-up with medical providers.    PHYSICAL EXAM  Temp:  [97.8 °F (36.6 °C)-98.5 °F (36.9 °C)] 97.8 °F (36.6 °C)  Heart Rate:  [] 115  Resp:  [16-22] 18  BP: (111-122)/(64-86) 112/74  Body mass index is 36.54 kg/m².  Physical Exam  Constitutional: She is oriented to person, place, and time. No distress.   Cardiovascular: Normal rate and regular rhythm.  Exam reveals no friction rub.    No murmur heard.  Pulmonary/Chest: Effort normal. No respiratory distress. She has rales (Cleared with coughing).   Abdominal: Soft. Bowel sounds are normal. She exhibits no distension. There is no tenderness.   Musculoskeletal: She exhibits no edema or tenderness.   Neurological: She is alert and oriented to person, place, and time. No cranial nerve deficit.   Skin: She is not diaphoretic. No erythema. No pallor.   Psychiatric: She has a normal mood and affect. Her behavior is normal.     CONDITION ON DISCHARGE  Stable.      DISCHARGE DISPOSITION   Home with family      ALLERGIES  No Known Allergies      DISCHARGE MEDICATIONS     Your medication list      START taking these medications      Instructions Last Dose Given Next Dose Due   aspirin 325 MG EC tablet  Start taking on:  4/15/2018      Take  1 tablet by mouth Daily.       atorvastatin 40 MG tablet  Commonly known as:  LIPITOR  Start taking on:  4/15/2018      Take 1 tablet by mouth Daily.          CHANGE how you take these medications      Instructions Last Dose Given Next Dose Due   glipiZIDE 10 MG tablet  Commonly known as:  GLUCOTROL  What changed:   medication strength   how much to take      Take 1 tablet by mouth Every Morning.          CONTINUE taking these medications      Instructions Last Dose Given Next Dose Due   DULoxetine 60 MG capsule  Commonly known as:  CYMBALTA      Take 60 mg by mouth Daily.       lisinopril 20 MG tablet  Commonly known as:  PRINIVIL,ZESTRIL      Take 20 mg by mouth Daily.       metoprolol succinate XL 25 MG 24 hr tablet  Commonly known as:  TOPROL-XL      Take 25 mg by mouth Daily.       montelukast 10 MG tablet  Commonly known as:  SINGULAIR      Take 10 mg by mouth Every Night.          STOP taking these medications    simvastatin 40 MG tablet  Commonly known as:  ZOCOR              Where to Get Your Medications      These medications were sent to [facility]/pharmacy #[id number] - [place], [place] - [address] [place] [address] RD AT [facility] [phone] [facility] [phone] [address], [place] [address]    Phone:  [phone]    aspirin 325 MG EC tablet   atorvastatin 40 MG tablet   glipiZIDE 10 MG tablet          Activity Instructions     Activity as Tolerated         No future appointments.  Follow-up Information     THEO Feng Follow up in 3 month(s).    Specialties:  Neurology, Nurse Practitioner  Contact information:  [address] [facility] [address]  [place] [address]  [phone]             Yee Khalil MD Follow up.    Specialty:  Cardiology  Why:  Office will call you to set up stress test and transesophageal echocardiogram  Contact information:  [address] [facility]  [address]  [place] [address]  [phone]             No Known Provider Follow up in 1 week(s).    Contact  information:  [facility] [address]                   TEST  RESULTS PENDING AT DISCHARGE  None       CODE STATUS  Full Code        Vladislav Quinteros MD  [facility]  04/14/18  8:53 AM      Time: greater than 30 minutes.

## 2018-04-14 NOTE — PLAN OF CARE
Problem: Patient Care Overview  Goal: Plan of Care Review  Outcome: Outcome(s) achieved Date Met: 04/14/18 04/14/18 0958   Coping/Psychosocial   Plan of Care Reviewed With patient   Plan of Care Review   Progress improving   OTHER   Outcome Summary VSS. HR tachy, but MDs aware. A&Ox4. Pt being D/C'd home today with a holter monitor per Cardio and Cardio will be in contact with pt on Monday for outpatient GREG. Family to transport pt home.     Goal: Individualization and Mutuality  Outcome: Outcome(s) achieved Date Met: 04/14/18    Goal: Discharge Needs Assessment  Outcome: Outcome(s) achieved Date Met: 04/14/18    Goal: Interprofessional Rounds/Family Conf  Outcome: Outcome(s) achieved Date Met: 04/14/18

## 2018-04-16 NOTE — PROGRESS NOTES
Case Management Discharge Note    Final Note: Discharge home.     Destination     No service coordination in this encounter.      Durable Medical Equipment     No service coordination in this encounter.      Dialysis/Infusion     No service coordination in this encounter.      Home Medical Care     No service coordination in this encounter.      Social Care     No service coordination in this encounter.        Other:  (Private auto)    Final Discharge Disposition Code: 01 - home or self-care

## 2018-04-16 NOTE — PAYOR COMM NOTE
"Nerissa Jennings (52 y.o. Female)               ATTENTION;   DC SUMMARY ,   CASE REF [id number]  , DC SUMMARY PER YOUR REQUEST.                  Date of Birth Social Security Number Address Home Phone MRN    1965  [id number] [facility] [id number] [phone] [id number]    [employer] Status          None Single       Admission Date Admission Type Admitting Provider Attending Provider Department, Room/Bed    18 Emergency Grady Mejia MD  [address], 416/1    Discharge Date Discharge Disposition Discharge Destination        2018 Home or Self Care              Attending Provider:  (none)   Allergies:  No Known Allergies    Isolation:  None   Infection:  None   Code Status:  Prior    Ht:  162.6 cm (64\")   Wt:  96.6 kg (212 lb 14.4 oz)    Admission Cmt:  None   Principal Problem:  Cerebrovascular accident (CVA) due to thrombosis of cerebral artery [I63.30]                 Active Insurance as of 2018     Primary Coverage     Payor Plan Insurance Group Employer/Plan Group    [employer] BLUE CROSS [id number]     Payor Plan Address Payor Plan Phone Number Effective From Effective To    [address] [phone] 2018     [place] [address]       Subscriber Name Subscriber Birth Date Member ID       NERISSA JENNINGS 1965 [id number]                 Emergency Contacts      (Rel.) Home Phone Work Phone Mobile Phone    Julito Jennings (Son) [phone] -- --    Evi Max (Sister) -- -- [phone]               Discharge Summary      Vladislav Quinteros MD at 2018  8:53 AM                 Name: Nerissa Jennings  Age: 52 y.o.  Sex: female  :  1965  MRN: [id number]         Primary Care Physician: No Known Provider      Date of Admission:  2018  Date of Discharge:  2018      CHIEF COMPLAINT  Syncope      DISCHARGE DIAGNOSIS  Active Hospital Problems (** Indicates Principal " Problem)    Diagnosis Date Noted   • **Cerebrovascular accident (CVA) due to thrombosis of cerebral artery [I63.30] 04/12/2018   • Tobacco abuse [Z72.0] 04/14/2018   • COPD (chronic obstructive pulmonary disease) [J44.9] 04/12/2018   • Type 2 diabetes mellitus [E11.9] 04/12/2018   • Hypertension [I10] 04/12/2018      Resolved Hospital Problems    Diagnosis Date Noted Date Resolved   • New onset seizure [R56.9] 04/12/2018 04/14/2018       SECONDARY DIAGNOSES  Past Medical History:   Diagnosis Date   • COPD (chronic obstructive pulmonary disease)    • Diabetes mellitus    • Hyperlipidemia    • Hypertension    • Neuropathy    • Rheumatoid arthritis    • Tachycardia        CONSULTS   Consult Orders (all)     Start     Ordered    04/13/18 1418  Inpatient Cardiology Consult  Once     Specialty:  Cardiology  Provider:  Yee Khalil MD    04/13/18 1417    04/12/18 0246  Inpatient Neurology Consult  Once     Specialty:  Neurology  Provider:  Jarocho Huynh MD    04/12/18 0247          PROCEDURES PERFORMED    EEG    Impression:  Normal EEG during wakefulness and stage II sleep.  2 clinical events were not accompanied by any electrographic seizure activity.    2D ECHO    Interpretation Summary   · Left ventricular systolic function is normal. Estimated EF = 59%.  · Left ventricular diastolic dysfunction (grade I) consistent with impaired relaxation.     MRA OF THE HEAD WITHOUT CONTRAST AND MRA OF THE NECK WITH AND WITHOUT  CONTRAST      IMPRESSION:     Essentially unremarkable MRA of the head. Again, there is diminished  flow-related enhancement within the post PICA segment of the right  vertebral artery which is felt to be most likely artifactual in nature  as this portion of the right vertebral artery has a relatively  unremarkable appearance on the MRA of the neck.     IMPRESSION:     The study is prominently compromised by motion artifact. Otherwise, the  common carotid artery bifurcations and proximal internal  carotids have  no NASCET significant stenosis.     STAT MRI OF THE BRAIN WITH AND WITHOUT CONTRAST 04/12/2018      IMPRESSION:     1. There is a 6 mm rounded acute lacunar type infarct in the posterior  inferior lateral left putamen extending into the left external capsule.     2. There is a tiny 5 x 3 mm old posterior superior right frontal white  matter infarct and there is an extremely subtle 5 x 2 mm cortical area  of encephalomalacia in the lateral right parietal cortex suggesting a  tiny old lateral right parietal cortical infarct in the right MCA  territory. The remainder of the MRI of the head is normal. The results  of the study were communicated to Dr. Francis Aguayo by telephone on  0412/2018 at 7:50 AM.    CT SCAN OF THE BRAIN WITHOUT CONTRAST.     IMPRESSION:  No definite acute intracranial pathology.        CHEST PA AND LATERAL.      IMPRESSION:  1.  Obliteration of the right cardiophrenic angle may be due to  atelectasis or a large fat pad.  2.  Further evaluation with CT scan is recommended.          HOSPITAL COURSE    The patient is a 52-year-old female with history of type 2 diabetes, hypertension, COPD who came to the hospital for weakness and multiple seizure-like episodes.  She was admitted to our service for further workup.  Her symptoms were not overtly consistent with a seizure episode.  She had full workup including MRI which revealed an acute stroke.  She had complete workup for stroke and neurology was consulted.  They did not believe the small stroke was to blame for her spells.  Cardiology was also consulted and she was recommended to have a zio patch upon discharge.  She will also have a transesophageal echocardiogram and stress test as an outpatient.  Cardiology office will call her to get this set up.  In regards to the spells they were unlikely to be seizures.  She had EEG and had 2 clinical episodes during the EEG.  There is no epileptiform activity during these spells.  Her imaging  results and studies are above.  Her Keppra was started on admission was discontinued.  She'll be discharged with statin and antiplatelet.  She'll follow-up with Bridget Pearl in 3 months at the cardiology office will schedule the stress test and transesophageal echocardiogram.  The patient is medically stable for discharge today.  She is in agreement with discharge plan today.  She was instructed on tobacco cessation and close follow-up with medical providers.    PHYSICAL EXAM  Temp:  [97.8 °F (36.6 °C)-98.5 °F (36.9 °C)] 97.8 °F (36.6 °C)  Heart Rate:  [] 115  Resp:  [16-22] 18  BP: (111-122)/(64-86) 112/74  Body mass index is 36.54 kg/m².  Physical Exam  Constitutional: She is oriented to person, place, and time. No distress.   Cardiovascular: Normal rate and regular rhythm.  Exam reveals no friction rub.    No murmur heard.  Pulmonary/Chest: Effort normal. No respiratory distress. She has rales (Cleared with coughing).   Abdominal: Soft. Bowel sounds are normal. She exhibits no distension. There is no tenderness.   Musculoskeletal: She exhibits no edema or tenderness.   Neurological: She is alert and oriented to person, place, and time. No cranial nerve deficit.   Skin: She is not diaphoretic. No erythema. No pallor.   Psychiatric: She has a normal mood and affect. Her behavior is normal.     CONDITION ON DISCHARGE  Stable.      DISCHARGE DISPOSITION   Home with family      ALLERGIES  No Known Allergies      DISCHARGE MEDICATIONS     Your medication list      START taking these medications      Instructions Last Dose Given Next Dose Due   aspirin 325 MG EC tablet  Start taking on:  4/15/2018      Take 1 tablet by mouth Daily.       atorvastatin 40 MG tablet  Commonly known as:  LIPITOR  Start taking on:  4/15/2018      Take 1 tablet by mouth Daily.          CHANGE how you take these medications      Instructions Last Dose Given Next Dose Due   glipiZIDE 10 MG tablet  Commonly known as:  GLUCOTROL  What  changed:   medication strength   how much to take      Take 1 tablet by mouth Every Morning.          CONTINUE taking these medications      Instructions Last Dose Given Next Dose Due   DULoxetine 60 MG capsule  Commonly known as:  CYMBALTA      Take 60 mg by mouth Daily.       lisinopril 20 MG tablet  Commonly known as:  PRINIVIL,ZESTRIL      Take 20 mg by mouth Daily.       metoprolol succinate XL 25 MG 24 hr tablet  Commonly known as:  TOPROL-XL      Take 25 mg by mouth Daily.       montelukast 10 MG tablet  Commonly known as:  SINGULAIR      Take 10 mg by mouth Every Night.          STOP taking these medications    simvastatin 40 MG tablet  Commonly known as:  ZOCOR              Where to Get Your Medications      These medications were sent to [facility]/pharmacy #[id number] - [place], [place] - [address] [place] [address] RD. AT [facility] [phone] [facility] [phone]   [address] [place] [address] [address] [place] [id number]    Phone:  [phone]    aspirin 325 MG EC tablet   atorvastatin 40 MG tablet   glipiZIDE 10 MG tablet          Activity Instructions     Activity as Tolerated         No future appointments.  Follow-up Information     THEO Feng Follow up in 3 month(s).    Specialties:  Neurology, Nurse Practitioner  Contact information:  [address] [facility] [address]  [place] [address]  [phone]             Yee Khalil MD Follow up.    Specialty:  Cardiology  Why:  Office will call you to set up stress test and transesophageal echocardiogram  Contact information:  [address] [facility]  [address]  [place] [address]  [phone]             No Known Provider Follow up in 1 week(s).    Contact information:  [facility] [address]                   TEST  RESULTS PENDING AT DISCHARGE  None       CODE STATUS  Full Code        Vladislav Quinteros MD  [facility]  04/14/18  8:53 AM      Time: greater than 30 minutes.      Electronically signed by Vladislav Quinteros MD at  4/14/2018  9:03 AM

## 2018-04-30 ENCOUNTER — HOSPITAL ENCOUNTER (OUTPATIENT)
Dept: CARDIOLOGY | Facility: HOSPITAL | Age: 53
Discharge: HOME | End: 2018-04-30
Admitting: INTERNAL MEDICINE

## 2018-04-30 ENCOUNTER — TELEPHONE (OUTPATIENT)
Dept: CARDIOLOGY | Facility: CLINIC | Age: 53
End: 2018-04-30

## 2018-04-30 DIAGNOSIS — I20.8 STABLE ANGINA (HCC): ICD-10-CM

## 2018-04-30 LAB
BH CV NUCLEAR PRIOR STUDY: 3
BH CV STRESS BP STAGE 1: NORMAL
BH CV STRESS COMMENTS STAGE 1: NORMAL
BH CV STRESS DOSE REGADENOSON STAGE 1: 0.4
BH CV STRESS DURATION MIN STAGE 1: 0
BH CV STRESS DURATION SEC STAGE 1: 10
BH CV STRESS HR STAGE 1: 110
BH CV STRESS PROTOCOL 1: NORMAL
BH CV STRESS RECOVERY BP: NORMAL MMHG
BH CV STRESS RECOVERY HR: 99 BPM
BH CV STRESS STAGE 1: 1
LV EF NUC BP: 72 %
MAXIMAL PREDICTED HEART RATE: 170 BPM
PERCENT MAX PREDICTED HR: 64.71 %
STRESS BASELINE BP: NORMAL MMHG
STRESS BASELINE HR: 95 BPM
STRESS PERCENT HR: 76 %
STRESS POST EXERCISE DUR SEC: 10 SEC
STRESS POST PEAK BP: NORMAL MMHG
STRESS POST PEAK HR: 110 BPM
STRESS TARGET HR: 145 BPM

## 2018-04-30 PROCEDURE — 93018 CV STRESS TEST I&R ONLY: CPT | Performed by: INTERNAL MEDICINE

## 2018-04-30 PROCEDURE — 93016 CV STRESS TEST SUPVJ ONLY: CPT | Performed by: INTERNAL MEDICINE

## 2018-04-30 PROCEDURE — 25010000002 REGADENOSON 0.4 MG/5ML SOLUTION: Performed by: INTERNAL MEDICINE

## 2018-04-30 PROCEDURE — 0 RUBIDIUM CHLORIDE: Performed by: INTERNAL MEDICINE

## 2018-04-30 PROCEDURE — 78492 MYOCRD IMG PET MLT RST&STRS: CPT | Performed by: INTERNAL MEDICINE

## 2018-04-30 PROCEDURE — 93017 CV STRESS TEST TRACING ONLY: CPT

## 2018-04-30 PROCEDURE — 78492 MYOCRD IMG PET MLT RST&STRS: CPT

## 2018-04-30 PROCEDURE — A9555 RB82 RUBIDIUM: HCPCS | Performed by: INTERNAL MEDICINE

## 2018-04-30 RX ADMIN — RUBIDIUM CHLORIDE RB-82 1 DOSE: 150 INJECTION, SOLUTION INTRAVENOUS at 10:46

## 2018-04-30 RX ADMIN — REGADENOSON 0.4 MG: 0.08 INJECTION, SOLUTION INTRAVENOUS at 11:45

## 2018-05-02 ENCOUNTER — LAB (OUTPATIENT)
Dept: LAB | Facility: HOSPITAL | Age: 53
End: 2018-05-02
Attending: INTERNAL MEDICINE

## 2018-05-02 ENCOUNTER — TRANSCRIBE ORDERS (OUTPATIENT)
Dept: CARDIOLOGY | Facility: CLINIC | Age: 53
End: 2018-05-02

## 2018-05-02 DIAGNOSIS — Z13.6 SCREENING FOR ISCHEMIC HEART DISEASE: ICD-10-CM

## 2018-05-02 DIAGNOSIS — Z01.810 PRE-OPERATIVE CARDIOVASCULAR EXAMINATION: ICD-10-CM

## 2018-05-02 DIAGNOSIS — Z01.810 PRE-OPERATIVE CARDIOVASCULAR EXAMINATION: Primary | ICD-10-CM

## 2018-05-02 LAB
ANION GAP SERPL CALCULATED.3IONS-SCNC: 14.4 MMOL/L
BASOPHILS # BLD AUTO: 0.03 10*3/MM3 (ref 0–0.2)
BASOPHILS NFR BLD AUTO: 0.2 % (ref 0–1.5)
BUN BLD-MCNC: 4 MG/DL (ref 6–20)
BUN/CREAT SERPL: 7.5 (ref 7–25)
CALCIUM SPEC-SCNC: 8.8 MG/DL (ref 8.6–10.5)
CHLORIDE SERPL-SCNC: 98 MMOL/L (ref 98–107)
CO2 SERPL-SCNC: 26.6 MMOL/L (ref 22–29)
CREAT BLD-MCNC: 0.53 MG/DL (ref 0.57–1)
DEPRECATED RDW RBC AUTO: 45.5 FL (ref 37–54)
EOSINOPHIL # BLD AUTO: 0.13 10*3/MM3 (ref 0–0.7)
EOSINOPHIL NFR BLD AUTO: 1 % (ref 0.3–6.2)
ERYTHROCYTE [DISTWIDTH] IN BLOOD BY AUTOMATED COUNT: 13.1 % (ref 11.7–13)
GFR SERPL CREATININE-BSD FRML MDRD: 122 ML/MIN/1.73
GLUCOSE BLD-MCNC: 96 MG/DL (ref 65–99)
HCT VFR BLD AUTO: 44 % (ref 35.6–45.5)
HGB BLD-MCNC: 14.9 G/DL (ref 11.9–15.5)
IMM GRANULOCYTES # BLD: 0.03 10*3/MM3 (ref 0–0.03)
IMM GRANULOCYTES NFR BLD: 0.2 % (ref 0–0.5)
LYMPHOCYTES # BLD AUTO: 4.4 10*3/MM3 (ref 0.9–4.8)
LYMPHOCYTES NFR BLD AUTO: 33.7 % (ref 19.6–45.3)
MCH RBC QN AUTO: 32.7 PG (ref 26.9–32)
MCHC RBC AUTO-ENTMCNC: 33.9 G/DL (ref 32.4–36.3)
MCV RBC AUTO: 96.5 FL (ref 80.5–98.2)
MONOCYTES # BLD AUTO: 0.74 10*3/MM3 (ref 0.2–1.2)
MONOCYTES NFR BLD AUTO: 5.7 % (ref 5–12)
NEUTROPHILS # BLD AUTO: 7.71 10*3/MM3 (ref 1.9–8.1)
NEUTROPHILS NFR BLD AUTO: 59.2 % (ref 42.7–76)
PLATELET # BLD AUTO: 178 10*3/MM3 (ref 140–500)
PMV BLD AUTO: 12 FL (ref 6–12)
POTASSIUM BLD-SCNC: 3.4 MMOL/L (ref 3.5–5.2)
RBC # BLD AUTO: 4.56 10*6/MM3 (ref 3.9–5.2)
SODIUM BLD-SCNC: 139 MMOL/L (ref 136–145)
WBC NRBC COR # BLD: 13.04 10*3/MM3 (ref 4.5–10.7)

## 2018-05-02 PROCEDURE — 36415 COLL VENOUS BLD VENIPUNCTURE: CPT

## 2018-05-02 PROCEDURE — 80048 BASIC METABOLIC PNL TOTAL CA: CPT

## 2018-05-02 PROCEDURE — 85025 COMPLETE CBC W/AUTO DIFF WBC: CPT

## 2018-05-07 PROCEDURE — 0298T HOLTER MONITOR - 72 HOUR UP TO 21 DAY: CPT | Performed by: INTERNAL MEDICINE

## 2020-10-26 ENCOUNTER — TELEPHONE (OUTPATIENT)
Dept: ONCOLOGY | Facility: CLINIC | Age: 55
End: 2020-10-26

## 2020-10-26 ENCOUNTER — APPOINTMENT (OUTPATIENT)
Dept: LAB | Facility: HOSPITAL | Age: 55
End: 2020-10-26

## 2020-10-26 ENCOUNTER — CONSULT (OUTPATIENT)
Dept: ONCOLOGY | Facility: CLINIC | Age: 55
End: 2020-10-26

## 2020-10-26 VITALS
DIASTOLIC BLOOD PRESSURE: 64 MMHG | HEIGHT: 65 IN | TEMPERATURE: 97.3 F | WEIGHT: 191.4 LBS | BODY MASS INDEX: 31.89 KG/M2 | HEART RATE: 90 BPM | SYSTOLIC BLOOD PRESSURE: 94 MMHG

## 2020-10-26 DIAGNOSIS — E11.42 TYPE 2 DIABETES MELLITUS WITH DIABETIC POLYNEUROPATHY, WITH LONG-TERM CURRENT USE OF INSULIN (HCC): ICD-10-CM

## 2020-10-26 DIAGNOSIS — D69.3 ACUTE ITP (HCC): Primary | ICD-10-CM

## 2020-10-26 DIAGNOSIS — Z79.4 TYPE 2 DIABETES MELLITUS WITH DIABETIC POLYNEUROPATHY, WITH LONG-TERM CURRENT USE OF INSULIN (HCC): ICD-10-CM

## 2020-10-26 LAB
BASOPHILS # BLD AUTO: 0.01 10*3/MM3 (ref 0–0.2)
BASOPHILS NFR BLD AUTO: 0.1 % (ref 0–1.5)
DEPRECATED RDW RBC AUTO: 46.2 FL (ref 37–54)
EOSINOPHIL # BLD AUTO: 0.1 10*3/MM3 (ref 0–0.4)
EOSINOPHIL NFR BLD AUTO: 1.2 % (ref 0.3–6.2)
ERYTHROCYTE [DISTWIDTH] IN BLOOD BY AUTOMATED COUNT: 13.6 % (ref 12.3–15.4)
HCT VFR BLD AUTO: 41.8 % (ref 34–46.6)
HGB BLD-MCNC: 14.2 G/DL (ref 12–15.9)
LYMPHOCYTES # BLD AUTO: 2.76 10*3/MM3 (ref 0.7–3.1)
LYMPHOCYTES NFR BLD AUTO: 33.4 % (ref 19.6–45.3)
MCH RBC QN AUTO: 32.4 PG (ref 26.6–33)
MCHC RBC AUTO-ENTMCNC: 34 G/DL (ref 31.5–35.7)
MCV RBC AUTO: 95.4 FL (ref 79–97)
MONOCYTES # BLD AUTO: 0.46 10*3/MM3 (ref 0.1–0.9)
MONOCYTES NFR BLD AUTO: 5.6 % (ref 5–12)
NEUTROPHILS NFR BLD AUTO: 4.93 10*3/MM3 (ref 1.7–7)
NEUTROPHILS NFR BLD AUTO: 59.7 % (ref 42.7–76)
PLATELET # BLD AUTO: 93 10*3/MM3 (ref 140–450)
PMV BLD AUTO: 12.2 FL (ref 6–12)
RBC # BLD AUTO: 4.38 10*6/MM3 (ref 3.77–5.28)
WBC # BLD AUTO: 8.26 10*3/MM3 (ref 3.4–10.8)

## 2020-10-26 PROCEDURE — 85025 COMPLETE CBC W/AUTO DIFF WBC: CPT | Performed by: INTERNAL MEDICINE

## 2020-10-26 PROCEDURE — 99203 OFFICE O/P NEW LOW 30 MIN: CPT | Performed by: INTERNAL MEDICINE

## 2020-10-26 RX ORDER — SIMVASTATIN 40 MG
40 TABLET ORAL
COMMUNITY
Start: 2020-09-17

## 2020-10-26 RX ORDER — ALBUTEROL SULFATE 90 UG/1
AEROSOL, METERED RESPIRATORY (INHALATION)
COMMUNITY
Start: 2020-09-09

## 2020-10-26 RX ORDER — CHOLECALCIFEROL (VITAMIN D3) 125 MCG
5000 CAPSULE ORAL DAILY
COMMUNITY
Start: 2020-09-17

## 2020-10-26 RX ORDER — GABAPENTIN 600 MG/1
600 TABLET ORAL 4 TIMES DAILY
COMMUNITY
Start: 2020-08-30

## 2020-10-26 RX ORDER — INSULIN ASPART 100 [IU]/ML
INJECTION, SOLUTION INTRAVENOUS; SUBCUTANEOUS
COMMUNITY
Start: 2020-07-19

## 2020-10-26 RX ORDER — SERTRALINE HYDROCHLORIDE 100 MG/1
100 TABLET, FILM COATED ORAL DAILY
COMMUNITY
Start: 2020-07-19

## 2020-10-26 RX ORDER — ONDANSETRON HYDROCHLORIDE 8 MG/1
TABLET, FILM COATED ORAL
COMMUNITY
Start: 2020-09-14

## 2020-10-26 NOTE — PROGRESS NOTES
ONCOLOGY/HEMATOLOGY    PATIENT: Nerissa Oliva  YOB: 1965  MEDICAL RECORD NUMBER: [id number] OF SERVICE: 10/26/20      CHIEF COMPLAINT: thrombocytopenia    HISTORY OF PRESENT ILLNESS:She comes unaccompanied today and has a PMH significant RA and DM and was in [facility] until early September when she was noted to have thrombocytpenia on routine f/u labs.  Her platelets were initially 85 and then on recheck they were 78.  The remainder of the CBC is WNL.  Of note, a CBC from 2018 in our system shows platelet count of 178.  She is referred regarding the thrombocytopenia.      Review of Systems - Oncology     Review of systems:  Constitutional: Denies fatigue, fever, night sweats or weight loss  HEENT: Denies headache, vision changes, dysphagia or odynophagia ; epistaxis with coughing hard  Lymph nodes: Denies lymphadenopathy  Heme: bruises persist for a long time and that has been occurring over the past 6 months  Respiratory: COPD   Cardiovascular: Denies chest pain, palpitations; edema by the evening  GI: Denies abdominal pain, nausea, vomiting, diarrhea, constipation, hematochezia or melena  : Denies dysuria or hematuria  Musculoskeletal: Denies myalgia or arthralgia  Skin: no rash  Neurologic: neuropathy  Endocrine: denies hot flashes  Psychologic: Denies depression or anxiety    Past Medical History:   Diagnosis Date   • COPD (chronic obstructive pulmonary disease) (CMS/HCC)    • Diabetes mellitus (CMS/HCC)    • Hyperlipidemia    • Hypertension    • Neuropathy    • Rheumatoid arthritis (CMS/HCC)    • Tachycardia        Past Surgical History:   Procedure Laterality Date   • BLADDER REPAIR      lift   • CARTILAGE SURGERY Bilateral     knees   • HERNIA REPAIR     • HYSTERECTOMY     • RECTAL PROLAPSE REPAIR         Family History   Problem Relation Age of Onset   • COPD Mother    • Lung cancer Father           Social History     Socioeconomic History   • Marital status: Single     Spouse name: Not  "on file   • Number of children: Not on file   • Years of education: Not on file   • Highest education level: Not on file   Tobacco Use   • Smoking status: Current Some Day Smoker     Packs/day: 0.50     Years: 30.00     Pack years: 15.00     Types: Cigarettes   • Tobacco comment: [employer] [place] information provided    Substance and Sexual Activity   • Alcohol use: No     Comment: socially   • Drug use: No       ALLERGIES:  Patient has no known allergies.    MEDICATION:  Current Outpatient Medications   Medication Sig Dispense Refill   • albuterol sulfate  (90 Base) MCG/ACT inhaler INHALE 2 PUFFS FOUR TIMES A DAY     • aspirin  MG EC tablet Take 1 tablet by mouth Daily. 30 tablet 0   • [employer] D3 125 MCG (5000 UT) capsule Take 5,000 Units by mouth Daily.     • gabapentin (NEURONTIN) 600 MG tablet Take 600 mg by mouth 4 (Four) Times a Day.     • glipiZIDE (GLUCOTROL) 10 MG tablet Take 1 tablet by mouth Every Morning. 30 tablet 0   • insulin detemir (LEVEMIR) 100 UNIT/ML injection Inject 15 Units under the skin into the appropriate area as directed 2 (Two) Times a Day.     • lisinopril (PRINIVIL,ZESTRIL) 20 MG tablet Take 20 mg by mouth Daily.     • metoprolol succinate XL (TOPROL-XL) 25 MG 24 hr tablet Take 25 mg by mouth Daily.     • montelukast (SINGULAIR) 10 MG tablet Take 10 mg by mouth Every Night.     • NovoLOG FlexPen 100 UNIT/ML solution pen-injector sc pen INJECT 10 UNITS INTO SKIN THREE TIMES DAILY     • ondansetron (ZOFRAN) 8 MG tablet TAKE ONE TABLET BY MOUTH 3 TIMES A DAY AS NEEDED     • sertraline (ZOLOFT) 100 MG tablet Take 100 mg by mouth Daily.     • simvastatin (ZOCOR) 40 MG tablet Take 40 mg by mouth every night at bedtime.       No current facility-administered medications for this visit.            PHYSICAL EXAM:    Current Vitals:  BP 94/64   Pulse 90   Temp 97.3 °F (36.3 °C)   Ht 165.1 cm (65\")   Wt 86.8 kg (191 lb 6.4 oz)   BMI 31.85 kg/m²       VITAL signs in the last 24 hours: " [unfilled]       10/26/20  1146   Weight: 86.8 kg (191 lb 6.4 oz)       Physical Exam     ECOG PERFORMANCE STATUS:1  Physical Exam:  General: No acute distress  Eyes: Sclera anicteric, EOMS-I  ENT: no mucositis  Neck: Supple, with full ROM  Lymph nodes: No cervical, lymphadenopathy  Pulmonary: wheezes.  CV: Regular rate and rhythm.  GI: Soft, non-tender,   Vascular: no edema  Neurologic: Cranial nerves 2-12 grossly intact, no focal motor deficits  Psych/MS: Alert and oriented x3,    LABORATORY/DATA:  WBC   Date Value Ref Range Status   05/02/2018 13.04 (H) 4.50 - 10.70 10*3/mm3 Final     RBC   Date Value Ref Range Status   05/02/2018 4.56 3.90 - 5.20 10*6/mm3 Final     Hemoglobin   Date Value Ref Range Status   05/02/2018 14.9 11.9 - 15.5 g/dL Final     Hematocrit   Date Value Ref Range Status   05/02/2018 44.0 35.6 - 45.5 % Final     MCV   Date Value Ref Range Status   05/02/2018 96.5 80.5 - 98.2 fL Final     MCH   Date Value Ref Range Status   05/02/2018 32.7 (H) 26.9 - 32.0 pg Final     MCHC   Date Value Ref Range Status   05/02/2018 33.9 32.4 - 36.3 g/dL Final     RDW   Date Value Ref Range Status   05/02/2018 13.1 (H) 11.7 - 13.0 % Final     RDW-SD   Date Value Ref Range Status   05/02/2018 45.5 37.0 - 54.0 fl Final     MPV   Date Value Ref Range Status   05/02/2018 12.0 6.0 - 12.0 fL Final     Platelets   Date Value Ref Range Status   05/02/2018 178 140 - 500 10*3/mm3 Final     Neutrophil %   Date Value Ref Range Status   05/02/2018 59.2 42.7 - 76.0 % Final     Lymphocyte %   Date Value Ref Range Status   05/02/2018 33.7 19.6 - 45.3 % Final     Monocyte %   Date Value Ref Range Status   05/02/2018 5.7 5.0 - 12.0 % Final     Eosinophil %   Date Value Ref Range Status   05/02/2018 1.0 0.3 - 6.2 % Final     Basophil %   Date Value Ref Range Status   05/02/2018 0.2 0.0 - 1.5 % Final     Immature Grans %   Date Value Ref Range Status   05/02/2018 0.2 0.0 - 0.5 % Final     Neutrophils, Absolute   Date Value Ref  Range Status   05/02/2018 7.71 1.90 - 8.10 10*3/mm3 Final     Lymphocytes, Absolute   Date Value Ref Range Status   05/02/2018 4.40 0.90 - 4.80 10*3/mm3 Final     Monocytes, Absolute   Date Value Ref Range Status   05/02/2018 0.74 0.20 - 1.20 10*3/mm3 Final     Eosinophils, Absolute   Date Value Ref Range Status   05/02/2018 0.13 0.00 - 0.70 10*3/mm3 Final     Basophils, Absolute   Date Value Ref Range Status   05/02/2018 0.03 0.00 - 0.20 10*3/mm3 Final     Immature Grans, Absolute   Date Value Ref Range Status   05/02/2018 0.03 0.00 - 0.03 10*3/mm3 Final     Lab Results   Component Value Date    GLUCOSE 96 05/02/2018    BUN 4 (L) 05/02/2018    CREATININE 0.53 (L) 05/02/2018    EGFRIFNONA 122 05/02/2018    BCR 7.5 05/02/2018    K 3.4 (L) 05/02/2018    CO2 26.6 05/02/2018    CALCIUM 8.8 05/02/2018    ALBUMIN 3.50 04/11/2018    AST 48 (H) 04/11/2018    ALT 22 04/11/2018     No results found for:  No results found for: SPEP, UPEP No results found for: LDH, URICACID No results found for: IRON, TIBC, FERRITIN       IMAGING:  No results found.    PROBLEM LIST:  Patient Active Problem List   Diagnosis   • COPD (chronic obstructive pulmonary disease) (CMS/HCC)   • Type 2 diabetes mellitus (CMS/HCC)   • Hypertension   • Cerebrovascular accident (CVA) due to thrombosis of cerebral artery (CMS/[facility])   • Tobacco abuse       Assessment & Plan  55 y.o. F w/ RA and probable ITP.      1) thrombocytopenia:  Likely ITP.  In light of already having an underlying autoimmune condition with the RA, ITP would be a consistent additional phenomenon.  It has been about 6 weeks since her last labs.  We will repeat them.  If the platelets are overall stable and above 50 then we can defer treatment with steroids for now.  If we do need to initiate prednisone for the ITP, we will have to be cognizant of her DM.      At this time, will check CBC today and if stable recheck in 1 month.  If they have dropped significantly then we will initiate  treatment and monitor improvement.       Daniel Rodírguez MD,  10/26/2020   12:12 EDT

## 2020-10-26 NOTE — TELEPHONE ENCOUNTER
Attempted both listed numbers for this pt to inform plts are low but better and no need for treatment at this time per Dr Rodríguez. Unidentified VM, no message left at this time.

## 2020-10-27 ENCOUNTER — TELEPHONE (OUTPATIENT)
Dept: ONCOLOGY | Facility: CLINIC | Age: 55
End: 2020-10-27

## 2020-10-27 NOTE — TELEPHONE ENCOUNTER
Called pt and let her know what Dr. Rodríguez stated. Advised that her platlets are lower than they had been but still high enough that she does not require treatment at this time.   Pt advised to keep appts as scheduled.   Pt v/u of all the above.

## 2021-03-24 ENCOUNTER — BULK ORDERING (OUTPATIENT)
Dept: CASE MANAGEMENT | Facility: OTHER | Age: 56
End: 2021-03-24

## 2021-03-24 DIAGNOSIS — Z23 IMMUNIZATION DUE: ICD-10-CM

## 2021-04-05 ENCOUNTER — IMMUNIZATION (OUTPATIENT)
Dept: VACCINE CLINIC | Facility: HOSPITAL | Age: 56
End: 2021-04-05

## 2021-04-05 DIAGNOSIS — Z23 IMMUNIZATION DUE: ICD-10-CM

## 2021-04-05 PROCEDURE — 0001A: CPT | Performed by: INTERNAL MEDICINE

## 2021-04-05 PROCEDURE — 91300 HC SARSCOV02 VAC 30MCG/0.3ML IM: CPT | Performed by: INTERNAL MEDICINE

## 2021-04-26 ENCOUNTER — IMMUNIZATION (OUTPATIENT)
Dept: VACCINE CLINIC | Facility: HOSPITAL | Age: 56
End: 2021-04-26

## 2021-04-26 PROCEDURE — 91300 HC SARSCOV02 VAC 30MCG/0.3ML IM: CPT | Performed by: INTERNAL MEDICINE

## 2021-04-26 PROCEDURE — 0002A: CPT | Performed by: INTERNAL MEDICINE

## 2021-09-10 NOTE — TELEPHONE ENCOUNTER
I called and let her know her stress test was good.  She says she does not have an appointment for the transesophageal echocardiogram (order is in).  Will you please call her.  I can do them Monday through Wednesday at 8:15 at the office as long as I am not on call or post call.  If that does not work, we will need to see if someone else can do it  
Admission Reconciliation is Completed  Discharge Reconciliation is Completed

## 2022-02-08 ENCOUNTER — LAB REQUISITION (OUTPATIENT)
Dept: LAB | Facility: HOSPITAL | Age: 57
End: 2022-02-08

## 2022-02-08 DIAGNOSIS — D69.6 THROMBOCYTOPENIA, UNSPECIFIED: ICD-10-CM

## 2022-02-08 DIAGNOSIS — R16.0 HEPATOMEGALY, NOT ELSEWHERE CLASSIFIED: ICD-10-CM

## 2022-02-08 LAB
ALBUMIN SERPL-MCNC: 3.6 G/DL (ref 3.5–5.2)
ALBUMIN/GLOB SERPL: 1.1 G/DL
ALP SERPL-CCNC: 154 U/L (ref 39–117)
ALT SERPL W P-5'-P-CCNC: 11 U/L (ref 1–33)
ANION GAP SERPL CALCULATED.3IONS-SCNC: 10 MMOL/L (ref 5–15)
AST SERPL-CCNC: 33 U/L (ref 1–32)
BILIRUB SERPL-MCNC: 1.4 MG/DL (ref 0–1.2)
BUN SERPL-MCNC: 7 MG/DL (ref 6–20)
BUN/CREAT SERPL: 14.9 (ref 7–25)
CALCIUM SPEC-SCNC: 8.9 MG/DL (ref 8.6–10.5)
CHLORIDE SERPL-SCNC: 107 MMOL/L (ref 98–107)
CO2 SERPL-SCNC: 24 MMOL/L (ref 22–29)
CREAT SERPL-MCNC: 0.47 MG/DL (ref 0.57–1)
GFR SERPL CREATININE-BSD FRML MDRD: 138 ML/MIN/1.73
GLOBULIN UR ELPH-MCNC: 3.2 GM/DL
GLUCOSE SERPL-MCNC: 150 MG/DL (ref 65–99)
POTASSIUM SERPL-SCNC: 3.9 MMOL/L (ref 3.5–5.2)
PROT SERPL-MCNC: 6.8 G/DL (ref 6–8.5)
SODIUM SERPL-SCNC: 141 MMOL/L (ref 136–145)

## 2022-02-08 PROCEDURE — 80053 COMPREHEN METABOLIC PANEL: CPT | Performed by: INTERNAL MEDICINE

## 2024-04-09 ENCOUNTER — TRANSCRIBE ORDERS (OUTPATIENT)
Dept: ADMINISTRATIVE | Facility: HOSPITAL | Age: 59
End: 2024-04-09
Payer: COMMERCIAL

## 2024-04-09 DIAGNOSIS — Z02.71 ENCOUNTER FOR DISABILITY DETERMINATION: Primary | ICD-10-CM

## 2024-04-25 ENCOUNTER — HOSPITAL ENCOUNTER (OUTPATIENT)
Dept: RESPIRATORY THERAPY | Facility: HOSPITAL | Age: 59
Discharge: HOME | End: 2024-04-25

## 2024-04-25 VITALS — RESPIRATION RATE: 16 BRPM | OXYGEN SATURATION: 97 % | HEART RATE: 96 BPM

## 2024-04-25 DIAGNOSIS — Z02.71 ENCOUNTER FOR DISABILITY DETERMINATION: ICD-10-CM

## 2024-04-25 PROCEDURE — 94060 EVALUATION OF WHEEZING: CPT

## 2024-04-25 PROCEDURE — 94799 UNLISTED PULMONARY SVC/PX: CPT

## 2024-04-25 RX ORDER — ALBUTEROL SULFATE 90 UG/1
2 AEROSOL, METERED RESPIRATORY (INHALATION) ONCE
Status: COMPLETED | OUTPATIENT
Start: 2024-04-25 | End: 2024-04-25

## 2024-04-25 RX ADMIN — ALBUTEROL SULFATE 2 PUFF: 108 AEROSOL, METERED RESPIRATORY (INHALATION) at 14:57

## 2025-02-13 ENCOUNTER — HOSPITAL ENCOUNTER (EMERGENCY)
Facility: HOSPITAL | Age: 60
Discharge: HOME | End: 2025-02-14
Attending: EMERGENCY MEDICINE
Payer: MEDICAID

## 2025-02-13 ENCOUNTER — APPOINTMENT (OUTPATIENT)
Dept: GENERAL RADIOLOGY | Facility: HOSPITAL | Age: 60
End: 2025-02-13
Payer: MEDICAID

## 2025-02-13 ENCOUNTER — APPOINTMENT (OUTPATIENT)
Dept: CT IMAGING | Facility: HOSPITAL | Age: 60
End: 2025-02-13
Payer: MEDICAID

## 2025-02-13 DIAGNOSIS — S32.10XA CLOSED FRACTURE OF SACRUM, UNSPECIFIED PORTION OF SACRUM, INITIAL ENCOUNTER: ICD-10-CM

## 2025-02-13 DIAGNOSIS — J18.9 PNEUMONIA OF LEFT LOWER LOBE DUE TO INFECTIOUS ORGANISM: ICD-10-CM

## 2025-02-13 DIAGNOSIS — R55 SYNCOPE, UNSPECIFIED SYNCOPE TYPE: Primary | ICD-10-CM

## 2025-02-13 DIAGNOSIS — N39.0 URINARY TRACT INFECTION WITHOUT HEMATURIA, SITE UNSPECIFIED: ICD-10-CM

## 2025-02-13 LAB
ALBUMIN SERPL-MCNC: 3.2 G/DL (ref 3.5–5.2)
ALBUMIN/GLOB SERPL: 1 G/DL
ALP SERPL-CCNC: 214 U/L (ref 39–117)
ALT SERPL W P-5'-P-CCNC: 12 U/L (ref 1–33)
ANION GAP SERPL CALCULATED.3IONS-SCNC: 11.7 MMOL/L (ref 5–15)
AST SERPL-CCNC: 27 U/L (ref 1–32)
BACTERIA UR QL AUTO: ABNORMAL /HPF
BASOPHILS # BLD AUTO: 0.01 10*3/MM3 (ref 0–0.2)
BASOPHILS NFR BLD AUTO: 0.3 % (ref 0–1.5)
BILIRUB SERPL-MCNC: 1.1 MG/DL (ref 0–1.2)
BILIRUB UR QL STRIP: NEGATIVE
BUN SERPL-MCNC: 8 MG/DL (ref 6–20)
BUN/CREAT SERPL: 14.5 (ref 7–25)
CALCIUM SPEC-SCNC: 8.4 MG/DL (ref 8.6–10.5)
CHLORIDE SERPL-SCNC: 107 MMOL/L (ref 98–107)
CK SERPL-CCNC: 89 U/L (ref 20–180)
CLARITY UR: ABNORMAL
CO2 SERPL-SCNC: 22.3 MMOL/L (ref 22–29)
COLOR UR: ABNORMAL
CREAT SERPL-MCNC: 0.55 MG/DL (ref 0.57–1)
DEPRECATED RDW RBC AUTO: 49.4 FL (ref 37–54)
EGFRCR SERPLBLD CKD-EPI 2021: 107.1 ML/MIN/1.73
EOSINOPHIL # BLD AUTO: 0.13 10*3/MM3 (ref 0–0.4)
EOSINOPHIL NFR BLD AUTO: 3.5 % (ref 0.3–6.2)
ERYTHROCYTE [DISTWIDTH] IN BLOOD BY AUTOMATED COUNT: 15.3 % (ref 12.3–15.4)
GLOBULIN UR ELPH-MCNC: 3.3 GM/DL
GLUCOSE SERPL-MCNC: 154 MG/DL (ref 65–99)
GLUCOSE UR STRIP-MCNC: NEGATIVE MG/DL
HCT VFR BLD AUTO: 31.7 % (ref 34–46.6)
HGB BLD-MCNC: 9.6 G/DL (ref 12–15.9)
HGB UR QL STRIP.AUTO: NEGATIVE
HYALINE CASTS UR QL AUTO: ABNORMAL /LPF
IMM GRANULOCYTES # BLD AUTO: 0.01 10*3/MM3 (ref 0–0.05)
IMM GRANULOCYTES NFR BLD AUTO: 0.3 % (ref 0–0.5)
KETONES UR QL STRIP: ABNORMAL
LEUKOCYTE ESTERASE UR QL STRIP.AUTO: ABNORMAL
LYMPHOCYTES # BLD AUTO: 1.2 10*3/MM3 (ref 0.7–3.1)
LYMPHOCYTES NFR BLD AUTO: 32 % (ref 19.6–45.3)
MAGNESIUM SERPL-MCNC: 1.9 MG/DL (ref 1.6–2.6)
MCH RBC QN AUTO: 26.6 PG (ref 26.6–33)
MCHC RBC AUTO-ENTMCNC: 30.3 G/DL (ref 31.5–35.7)
MCV RBC AUTO: 87.8 FL (ref 79–97)
MONOCYTES # BLD AUTO: 0.28 10*3/MM3 (ref 0.1–0.9)
MONOCYTES NFR BLD AUTO: 7.5 % (ref 5–12)
NEUTROPHILS NFR BLD AUTO: 2.12 10*3/MM3 (ref 1.7–7)
NEUTROPHILS NFR BLD AUTO: 56.4 % (ref 42.7–76)
NITRITE UR QL STRIP: POSITIVE
NRBC BLD AUTO-RTO: 0 /100 WBC (ref 0–0.2)
PH UR STRIP.AUTO: 5.5 [PH] (ref 5–8)
PLATELET # BLD AUTO: 96 10*3/MM3 (ref 140–450)
PMV BLD AUTO: 11 FL (ref 6–12)
POTASSIUM SERPL-SCNC: 3.1 MMOL/L (ref 3.5–5.2)
PROT SERPL-MCNC: 6.5 G/DL (ref 6–8.5)
PROT UR QL STRIP: NEGATIVE
RBC # BLD AUTO: 3.61 10*6/MM3 (ref 3.77–5.28)
RBC # UR STRIP: ABNORMAL /HPF
REF LAB TEST METHOD: ABNORMAL
SODIUM SERPL-SCNC: 141 MMOL/L (ref 136–145)
SP GR UR STRIP: 1.02 (ref 1–1.03)
SQUAMOUS #/AREA URNS HPF: ABNORMAL /HPF
TSH SERPL DL<=0.05 MIU/L-ACNC: 1.72 UIU/ML (ref 0.27–4.2)
UROBILINOGEN UR QL STRIP: ABNORMAL
WBC # UR STRIP: ABNORMAL /HPF
WBC NRBC COR # BLD AUTO: 3.75 10*3/MM3 (ref 3.4–10.8)

## 2025-02-13 PROCEDURE — 72220 X-RAY EXAM SACRUM TAILBONE: CPT

## 2025-02-13 PROCEDURE — 70450 CT HEAD/BRAIN W/O DYE: CPT

## 2025-02-13 PROCEDURE — 25810000003 LACTATED RINGERS SOLUTION: Performed by: EMERGENCY MEDICINE

## 2025-02-13 PROCEDURE — 87086 URINE CULTURE/COLONY COUNT: CPT | Performed by: EMERGENCY MEDICINE

## 2025-02-13 PROCEDURE — 82550 ASSAY OF CK (CPK): CPT | Performed by: EMERGENCY MEDICINE

## 2025-02-13 PROCEDURE — 83735 ASSAY OF MAGNESIUM: CPT | Performed by: EMERGENCY MEDICINE

## 2025-02-13 PROCEDURE — 80050 GENERAL HEALTH PANEL: CPT | Performed by: EMERGENCY MEDICINE

## 2025-02-13 PROCEDURE — 99284 EMERGENCY DEPT VISIT MOD MDM: CPT

## 2025-02-13 PROCEDURE — 81001 URINALYSIS AUTO W/SCOPE: CPT | Performed by: EMERGENCY MEDICINE

## 2025-02-13 PROCEDURE — 72170 X-RAY EXAM OF PELVIS: CPT

## 2025-02-13 PROCEDURE — 72110 X-RAY EXAM L-2 SPINE 4/>VWS: CPT

## 2025-02-13 PROCEDURE — 25010000002 ONDANSETRON PER 1 MG: Performed by: EMERGENCY MEDICINE

## 2025-02-13 PROCEDURE — 71045 X-RAY EXAM CHEST 1 VIEW: CPT

## 2025-02-13 PROCEDURE — 96375 TX/PRO/DX INJ NEW DRUG ADDON: CPT

## 2025-02-13 PROCEDURE — 25010000002 MORPHINE PER 10 MG: Performed by: EMERGENCY MEDICINE

## 2025-02-13 PROCEDURE — 93005 ELECTROCARDIOGRAM TRACING: CPT | Performed by: EMERGENCY MEDICINE

## 2025-02-13 PROCEDURE — 87040 BLOOD CULTURE FOR BACTERIA: CPT | Performed by: EMERGENCY MEDICINE

## 2025-02-13 RX ORDER — SODIUM CHLORIDE 0.9 % (FLUSH) 0.9 %
10 SYRINGE (ML) INJECTION AS NEEDED
Status: DISCONTINUED | OUTPATIENT
Start: 2025-02-13 | End: 2025-02-14 | Stop reason: HOSPADM

## 2025-02-13 RX ORDER — ONDANSETRON 2 MG/ML
4 INJECTION INTRAMUSCULAR; INTRAVENOUS ONCE
Status: COMPLETED | OUTPATIENT
Start: 2025-02-13 | End: 2025-02-13

## 2025-02-13 RX ORDER — POTASSIUM CHLORIDE 1500 MG/1
40 TABLET, EXTENDED RELEASE ORAL ONCE
Status: COMPLETED | OUTPATIENT
Start: 2025-02-13 | End: 2025-02-14

## 2025-02-13 RX ORDER — HYDROCODONE BITARTRATE AND ACETAMINOPHEN 5; 325 MG/1; MG/1
1 TABLET ORAL EVERY 6 HOURS PRN
Qty: 12 TABLET | Refills: 0 | Status: SHIPPED | OUTPATIENT
Start: 2025-02-13

## 2025-02-13 RX ADMIN — MORPHINE SULFATE 4 MG: 4 INJECTION, SOLUTION INTRAMUSCULAR; INTRAVENOUS at 22:10

## 2025-02-13 RX ADMIN — ONDANSETRON 4 MG: 2 INJECTION, SOLUTION INTRAMUSCULAR; INTRAVENOUS at 22:10

## 2025-02-13 RX ADMIN — SODIUM CHLORIDE, SODIUM LACTATE, POTASSIUM CHLORIDE, AND CALCIUM CHLORIDE 500 ML: .6; .31; .03; .02 INJECTION, SOLUTION INTRAVENOUS at 22:11

## 2025-02-14 VITALS
DIASTOLIC BLOOD PRESSURE: 55 MMHG | BODY MASS INDEX: 31.82 KG/M2 | HEIGHT: 65 IN | WEIGHT: 191 LBS | TEMPERATURE: 97.7 F | RESPIRATION RATE: 18 BRPM | OXYGEN SATURATION: 95 % | SYSTOLIC BLOOD PRESSURE: 103 MMHG | HEART RATE: 99 BPM

## 2025-02-14 LAB
QT INTERVAL: 378 MS
QTC INTERVAL: 473 MS

## 2025-02-14 PROCEDURE — 25010000002 CEFTRIAXONE PER 250 MG: Performed by: EMERGENCY MEDICINE

## 2025-02-14 PROCEDURE — 96365 THER/PROPH/DIAG IV INF INIT: CPT

## 2025-02-14 PROCEDURE — 87040 BLOOD CULTURE FOR BACTERIA: CPT | Performed by: EMERGENCY MEDICINE

## 2025-02-14 PROCEDURE — 36415 COLL VENOUS BLD VENIPUNCTURE: CPT

## 2025-02-14 RX ADMIN — CEFTRIAXONE 2000 MG: 2 INJECTION, POWDER, FOR SOLUTION INTRAMUSCULAR; INTRAVENOUS at 00:08

## 2025-02-14 RX ADMIN — POTASSIUM CHLORIDE 40 MEQ: 1500 TABLET, EXTENDED RELEASE ORAL at 00:07

## 2025-02-14 NOTE — ED NOTES
Pt reports episodes of passing out for the past year. Pt reports she has brought this concern up to her primary care and they have been dismissive and unable to giver her a clear answer as to why this is happening. She had one of these episodes last night which caused her to fall and hit her lower back. Pt reports severe pain in this area. Pt also reports hitting her head, but complains of no pain here.

## 2025-02-14 NOTE — ED PROVIDER NOTES
Subjective   History of Present Illness  Chief complaint passing out back injury    History of present illness a 59-year-old female history of diabetes COPD hypertension rheumatoid arthritis who states that she has had episodes over the last year where she gets shaking all over mainly when she coughs and then she will pass out.  The patient states a progressive gotten worse over the last several weeks to months.  But she is even when she is coughing but it can happen at other times as well.  Patient is had no evaluation of this.  Patient has seen her primary care doctor but has had no further workup she has had some medication changes recently taken off her gabapentin and placed on amitriptyline.  She denies any fever chills she is got a cough nonproductive some mild shortness of breath no chest pain neck arm jaw pain.  She denies any leg pain or swelling no recent long car ride plane or immobilization foreign travels antibiotic use.  She has had some urinary problems and dysuria and frequency.  The patient states that she had another spell and she fell and hurt her back and has severe pain to her lower back and tailbone area.  No loss of bladder bowel control no bloody stool no bloody urine.  No numbness or ting or weakness to the extremities or the vaginal buttock area      Review of Systems   Constitutional:  Negative for chills and fever.   HENT:  Positive for congestion.    Respiratory:  Positive for cough and shortness of breath. Negative for chest tightness.    Cardiovascular:  Negative for chest pain and palpitations.   Gastrointestinal:  Negative for abdominal pain, blood in stool and vomiting.   Genitourinary:  Positive for difficulty urinating and dysuria. Negative for hematuria.   Musculoskeletal:  Positive for back pain. Negative for neck pain.   Skin:  Negative for rash.   Neurological:  Positive for syncope. Negative for facial asymmetry, speech difficulty and numbness.   Psychiatric/Behavioral:   Negative for confusion.        Past Medical History:   Diagnosis Date    COPD (chronic obstructive pulmonary disease)     Diabetes mellitus     Hyperlipidemia     Hypertension     Neuropathy     Rheumatoid arthritis     Tachycardia        Allergies   Allergen Reactions    Codeine Hives       Past Surgical History:   Procedure Laterality Date    BLADDER REPAIR      lift    CARTILAGE SURGERY Bilateral     knees    HERNIA REPAIR      HYSTERECTOMY      RECTAL PROLAPSE REPAIR         Family History   Problem Relation Age of Onset    COPD Mother     Lung cancer Father        Social History     Socioeconomic History    Marital status:    Tobacco Use    Smoking status: Some Days     Current packs/day: 0.50     Average packs/day: 0.5 packs/day for 30.0 years (15.0 ttl pk-yrs)     Types: Cigarettes    Tobacco comments:     [employer] [place] information provided    Substance and Sexual Activity    Alcohol use: No     Comment: socially    Drug use: No     Prior to Admission medications    Medication Sig Start Date End Date Taking? Authorizing Provider   albuterol sulfate  (90 Base) MCG/ACT inhaler INHALE 2 PUFFS FOUR TIMES A DAY 9/9/20   Shadia Dinh MD   amoxicillin-clavulanate (AUGMENTIN) 875-125 MG per tablet Take 1 tablet by mouth 2 (Two) Times a Day. 2/13/25   Josemanuel Dillard MD   aspirin  MG EC tablet Take 1 tablet by mouth Daily. 4/15/18   NICOLE Quinteros MD   [facility] D3 125 MCG (5000 UT) capsule Take 5,000 Units by mouth Daily. 9/17/20   Shadia Dinh MD   gabapentin (NEURONTIN) 600 MG tablet Take 600 mg by mouth 4 (Four) Times a Day. 8/30/20   Shadia Dinh MD   glipiZIDE (GLUCOTROL) 10 MG tablet Take 1 tablet by mouth Every Morning. 4/14/18   NICOLE Quinteros MD   HYDROcodone-acetaminophen (NORCO) 5-325 MG per tablet Take 1 tablet by mouth Every 6 (Six) Hours As Needed for Severe Pain. 2/13/25   Josemanuel Dillard MD   insulin detemir (LEVEMIR) 100 UNIT/ML injection Inject 15 Units  under the skin into the appropriate area as directed 2 (Two) Times a Day.    Shadia Dinh MD   lisinopril (PRINIVIL,ZESTRIL) 20 MG tablet Take 20 mg by mouth Daily.    Shadia Dinh MD   metoprolol succinate XL (TOPROL-XL) 25 MG 24 hr tablet Take 25 mg by mouth Daily.    Shadia Dinh MD   montelukast (SINGULAIR) 10 MG tablet Take 10 mg by mouth Every Night.    Shadia Dinh MD   NovoLOG FlexPen 100 UNIT/ML solution pen-injector sc pen INJECT 10 UNITS INTO SKIN THREE TIMES DAILY 7/19/20   Shadia Dinh MD   ondansetron (ZOFRAN) 8 MG tablet TAKE ONE TABLET BY MOUTH 3 TIMES A DAY AS NEEDED 9/14/20   Shadia Dinh MD   sertraline (ZOLOFT) 100 MG tablet Take 100 mg by mouth Daily. 7/19/20   Shadia Dinh MD   simvastatin (ZOCOR) 40 MG tablet Take 40 mg by mouth every night at bedtime. 9/17/20   Shadia Dinh MD   Taken off the gabapentin and placed on amitriptyline      Objective   Physical Exam  Constitutional is a 59-year-old female awake alert no acute distress triage vital signs reviewed.  HEENT extraocular muscles are intact pupils equal round reactive there is no raccoon or Lovett sign no drainage from ears nose mouth is clear back no cervical thoracic spine tenderness no posterior rib tenderness she has some lower lumbar spine tenderness and pain throughout the tailbone no bruising good sensation there is no sacral anesthesia.  No step-off or deformity.  No abscess or redness is noted.  Trachea midline no JV no bruits lungs scattered wheezes and rhonchi but no retractions heart regular without murmur rub abdomen soft nontender good bowel sounds no peritoneal findings or pulsatile masses pelvis and extremities full range of motion without deformities no cords or Homans' sign or evidence of DVT pulses equal throughout upper extremities skin is warm and dry without rashes or cellulitic changes neurologic awake alert orientated x 4 no face asymmetry  speech normal no drift the arms or legs normal finger-to-nose toes up medically big toe dorsiflex plantarflex without difficulty motor strength all normal no weakness in extremities.  Procedures           ED Course      Results for orders placed or performed during the hospital encounter of 02/13/25   ECG 12 Lead Syncope    Collection Time: 02/13/25  9:45 PM   Result Value Ref Range    QT Interval 378 ms    QTC Interval 473 ms   Urinalysis With Microscopic If Indicated (No Culture) - Urine, Clean Catch    Collection Time: 02/13/25 10:03 PM    Specimen: Urine, Clean Catch   Result Value Ref Range    Color, UA Dark Yellow (A) Yellow, Straw    Appearance, UA Turbid (A) Clear    pH, UA 5.5 5.0 - 8.0    Specific Gravity, UA 1.019 1.005 - 1.030    Glucose, UA Negative Negative    Ketones, UA Trace (A) Negative    Bilirubin, UA Negative Negative    Blood, UA Negative Negative    Protein, UA Negative Negative    Leuk Esterase, UA Small (1+) (A) Negative    Nitrite, UA Positive (A) Negative    Urobilinogen, UA 2.0 E.U./dL (A) 0.2 - 1.0 E.U./dL   Urinalysis, Microscopic Only - Urine, Clean Catch    Collection Time: 02/13/25 10:03 PM    Specimen: Urine, Clean Catch   Result Value Ref Range    RBC, UA None Seen None Seen, 0-2 /HPF    WBC, UA 6-10 (A) None Seen, 0-2 /HPF    Bacteria, UA 3+ (A) None Seen /HPF    Squamous Epithelial Cells, UA None Seen None Seen, 0-2 /HPF    Hyaline Casts, UA None Seen None Seen /LPF    Methodology Manual Light Microscopy    Comprehensive Metabolic Panel    Collection Time: 02/13/25 10:09 PM    Specimen: Arm, Left; Blood   Result Value Ref Range    Glucose 154 (H) 65 - 99 mg/dL    BUN 8 6 - 20 mg/dL    Creatinine 0.55 (L) 0.57 - 1.00 mg/dL    Sodium 141 136 - 145 mmol/L    Potassium 3.1 (L) 3.5 - 5.2 mmol/L    Chloride 107 98 - 107 mmol/L    CO2 22.3 22.0 - 29.0 mmol/L    Calcium 8.4 (L) 8.6 - 10.5 mg/dL    Total Protein 6.5 6.0 - 8.5 g/dL    Albumin 3.2 (L) 3.5 - 5.2 g/dL    ALT (SGPT) 12 1 - 33  U/L    AST (SGOT) 27 1 - 32 U/L    Alkaline Phosphatase 214 (H) 39 - 117 U/L    Total Bilirubin 1.1 0.0 - 1.2 mg/dL    Globulin 3.3 gm/dL    A/G Ratio 1.0 g/dL    BUN/Creatinine Ratio 14.5 7.0 - 25.0    Anion Gap 11.7 5.0 - 15.0 mmol/L    eGFR 107.1 >60.0 mL/min/1.73   CK    Collection Time: 02/13/25 10:09 PM    Specimen: Arm, Left; Blood   Result Value Ref Range    Creatine Kinase 89 20 - 180 U/L   Magnesium    Collection Time: 02/13/25 10:09 PM    Specimen: Arm, Left; Blood   Result Value Ref Range    Magnesium 1.9 1.6 - 2.6 mg/dL   TSH Rfx On Abnormal To Free T4    Collection Time: 02/13/25 10:09 PM    Specimen: Arm, Left; Blood   Result Value Ref Range    TSH 1.720 0.270 - 4.200 uIU/mL   CBC Auto Differential    Collection Time: 02/13/25 10:09 PM    Specimen: Arm, Left; Blood   Result Value Ref Range    WBC 3.75 3.40 - 10.80 10*3/mm3    RBC 3.61 (L) 3.77 - 5.28 10*6/mm3    Hemoglobin 9.6 (L) 12.0 - 15.9 g/dL    Hematocrit 31.7 (L) 34.0 - 46.6 %    MCV 87.8 79.0 - 97.0 fL    MCH 26.6 26.6 - 33.0 pg    MCHC 30.3 (L) 31.5 - 35.7 g/dL    RDW 15.3 12.3 - 15.4 %    RDW-SD 49.4 37.0 - 54.0 fl    MPV 11.0 6.0 - 12.0 fL    Platelets 96 (L) 140 - 450 10*3/mm3    Neutrophil % 56.4 42.7 - 76.0 %    Lymphocyte % 32.0 19.6 - 45.3 %    Monocyte % 7.5 5.0 - 12.0 %    Eosinophil % 3.5 0.3 - 6.2 %    Basophil % 0.3 0.0 - 1.5 %    Immature Grans % 0.3 0.0 - 0.5 %    Neutrophils, Absolute 2.12 1.70 - 7.00 10*3/mm3    Lymphocytes, Absolute 1.20 0.70 - 3.10 10*3/mm3    Monocytes, Absolute 0.28 0.10 - 0.90 10*3/mm3    Eosinophils, Absolute 0.13 0.00 - 0.40 10*3/mm3    Basophils, Absolute 0.01 0.00 - 0.20 10*3/mm3    Immature Grans, Absolute 0.01 0.00 - 0.05 10*3/mm3    nRBC 0.0 0.0 - 0.2 /100 WBC     CT Head Without Contrast    Result Date: 2/13/2025  Impression: No acute intracranial findings. Electronically Signed: Gilles Cross  2/13/2025 10:51 PM EST  Workstation ID: [id number]    XR Spine Lumbar Complete 4+VW    Result Date:  2/13/2025  Impression: 1. No evidence of lumbar spine fracture 2. Questionable nondisplaced fracture of the mid sacrum 3. No evidence of pelvis fracture Electronically Signed: Trenton Smith  2/13/2025 10:49 PM EST  Workstation ID: [id number]    XR Pelvis 1 or 2 View    Result Date: 2/13/2025  Impression: 1. No evidence of lumbar spine fracture 2. Questionable nondisplaced fracture of the mid sacrum 3. No evidence of pelvis fracture Electronically Signed: Trenton Smith  2/13/2025 10:49 PM EST  Workstation ID: [id number]    XR Sacrum & Coccyx    Result Date: 2/13/2025  Impression: 1. No evidence of lumbar spine fracture 2. Questionable nondisplaced fracture of the mid sacrum 3. No evidence of pelvis fracture Electronically Signed: Trenton Smith  2/13/2025 10:49 PM EST  Workstation ID: [id number]    XR Chest 1 View    Result Date: 2/13/2025  Impression: There is left mid to lower lung airspace disease. Correlate for pneumonia. Electronically Signed: Severiano Agee MD  2/13/2025 10:43 PM EST  Workstation ID: [id number]   Medications   sodium chloride 0.9 % flush 10 mL (has no administration in time range)   potassium chloride (KLOR-CON M20) CR tablet 40 mEq (has no administration in time range)   cefTRIAXone (ROCEPHIN) 2,000 mg in sodium chloride 0.9 % 100 mL MBP (has no administration in time range)   lactated ringers bolus 500 mL (500 mL Intravenous New Bag 2/13/25 2211)   morphine injection 4 mg (4 mg Intravenous Given 2/13/25 2210)   ondansetron (ZOFRAN) injection 4 mg (4 mg Intravenous Given 2/13/25 2210)                                            EKG my interpretation normal sinus rhythm rate 94 normal axis hypertrophy QTc of 473 normal EKG unchanged from 4/11/2018            Medical Decision Making  Medical decision making.  IV established monitor placement review of sinus rhythm EKG my interpretation normal sinus rhythm rate 94 normal axis hypertrophy QTc 473 normal EKG unchanged from 4/11/2018 given morphine 4 IV Zofran  4 IV and lactated Ringer's 500 cc bolus obtained by independent review comprehensive metabolic profile remarkable for blood sugar 154 potassium of 3.1 CK was 89 magnesium normal TSH normal CBC unremarkable hemoglobin 9.6 and platelets of 96,000.  CT head without obtained my independent review no tumors masses hemorrhage or acute findings radiology review unremarkable.  X-rays obtained of the pelvis and sacrum coccyx lumbar spine.  Nondependent view lumbar spine looks okay no fracture or subluxation.  Patient does look like she has a fracture to the sacrum.  Hips and pelvis look okay I do not see any fracture dislocation.  No evidence of lumbar spine fracture questionable nondisplaced fracture of the mid sacrum.  No pelvic fracture.  Chest x-ray obtained my independent review I do not see any evidence of failure there is no chronic lung disease.  No pneumothorax radiology review left mid to lower lung airspace disease possibly pneumonia.  Patient had had potassium 40 mg was given p.o. check cultures pain is given Rocephin 2 g IV.  The patient repeat exam is resting company no distress.  I do not see any evidence that suggest acute sepsis or bacteremia or DVT or pulmonary embolism or pericarditis myocarditis pericardial effusion dissection intracerebral hemorrhage intra-abdominal process perforation free air rupture AAA ischemic bowel based on my current history and physical clinical findings.  Not complete list of all possibilities.  Urinalysis obtained positive nitrate leukocyte 3+ bacteria that was cultured as well.  At this point is recommended patient be admitted to hospital please multiple syncopal episodes and now she has a sacral fracture UTI and pneumonia.  The patient refused states she wants to go home I talked her about the risk of this her family can talk her into it I can talk her into it.  She was discharged home for outpatient management she will return if she gets worse or changes her mind she was  placed on Augmentin.  Stable otherwise unremarkable ER course    Problems Addressed:  Closed fracture of sacrum, unspecified portion of sacrum, initial encounter: complicated acute illness or injury  Pneumonia of left lower lobe due to infectious organism: complicated acute illness or injury  Syncope, unspecified syncope type: complicated acute illness or injury  Urinary tract infection without hematuria, site unspecified: complicated acute illness or injury    Amount and/or Complexity of Data Reviewed  External Data Reviewed: ECG.  Labs: ordered. Decision-making details documented in ED Course.  Radiology: ordered and independent interpretation performed. Decision-making details documented in ED Course.  ECG/medicine tests: ordered and independent interpretation performed. Decision-making details documented in ED Course.    Risk  Prescription drug management.        Final diagnoses:   Syncope, unspecified syncope type   Closed fracture of sacrum, unspecified portion of sacrum, initial encounter   Urinary tract infection without hematuria, site unspecified   Pneumonia of left lower lobe due to infectious organism       ED Disposition  ED Disposition       ED Disposition   Discharge    Condition   Stable    Comment   --               PATIENT CONNECTION - [facility] [address]  [phone]  In 1 day      Josemanuel Frye IV, MD  [address] [place] [address]  [phone]    In 1 day      Gerry Virk MD  [address] [address]  [phone]    In 1 day           Medication List        New Prescriptions      amoxicillin-clavulanate 875-125 MG per tablet  Commonly known as: AUGMENTIN  Take 1 tablet by mouth 2 (Two) Times a Day.     HYDROcodone-acetaminophen 5-325 MG per tablet  Commonly known as: NORCO  Take 1 tablet by mouth Every 6 (Six) Hours As Needed for Severe Pain.               Where to Get Your Medications        These medications were sent to [facility] [id number] -  [place], [place] - [address] AT [facility] [address] - [phone] PH - [phone] FX  [address], [place] [address]      Phone: [phone]   amoxicillin-clavulanate 875-125 MG per tablet  HYDROcodone-acetaminophen 5-325 MG per tablet            Josemanuel Dillard MD  02/14/25 0050

## 2025-02-14 NOTE — DISCHARGE INSTRUCTIONS
Rest plenty of fluids.  Use your nebulizer.  Augmentin sent to your pharmacy.  [place] sent to pharmacy for pain will make you sleepy and constipated increase fluid and fiber is addicting so limit use.  Return for increasing shortness of breath vomiting altered mental status.  Current symptoms change of mind about staying in the hospital or any other new or worse problems or concerns return immediately to the ER.

## 2025-02-15 LAB — BACTERIA SPEC AEROBE CULT: NORMAL

## 2025-02-16 ENCOUNTER — HOSPITAL ENCOUNTER (EMERGENCY)
Facility: HOSPITAL | Age: 60
Discharge: HOME | End: 2025-02-16
Attending: EMERGENCY MEDICINE | Admitting: EMERGENCY MEDICINE
Payer: MEDICAID

## 2025-02-16 ENCOUNTER — APPOINTMENT (OUTPATIENT)
Dept: CT IMAGING | Facility: HOSPITAL | Age: 60
End: 2025-02-16
Payer: MEDICAID

## 2025-02-16 ENCOUNTER — APPOINTMENT (OUTPATIENT)
Dept: GENERAL RADIOLOGY | Facility: HOSPITAL | Age: 60
End: 2025-02-16
Payer: MEDICAID

## 2025-02-16 VITALS
DIASTOLIC BLOOD PRESSURE: 59 MMHG | WEIGHT: 195.33 LBS | SYSTOLIC BLOOD PRESSURE: 109 MMHG | HEIGHT: 65 IN | BODY MASS INDEX: 32.54 KG/M2 | OXYGEN SATURATION: 99 % | RESPIRATION RATE: 18 BRPM | TEMPERATURE: 97.9 F | HEART RATE: 85 BPM

## 2025-02-16 DIAGNOSIS — S80.00XA CONTUSION OF KNEE, UNSPECIFIED LATERALITY, INITIAL ENCOUNTER: Primary | ICD-10-CM

## 2025-02-16 DIAGNOSIS — M54.50 ACUTE LOW BACK PAIN, UNSPECIFIED BACK PAIN LATERALITY, UNSPECIFIED WHETHER SCIATICA PRESENT: ICD-10-CM

## 2025-02-16 DIAGNOSIS — S09.90XA CLOSED HEAD INJURY, INITIAL ENCOUNTER: ICD-10-CM

## 2025-02-16 PROCEDURE — 72170 X-RAY EXAM OF PELVIS: CPT

## 2025-02-16 PROCEDURE — 70450 CT HEAD/BRAIN W/O DYE: CPT

## 2025-02-16 PROCEDURE — 72131 CT LUMBAR SPINE W/O DYE: CPT

## 2025-02-16 PROCEDURE — 73562 X-RAY EXAM OF KNEE 3: CPT

## 2025-02-16 PROCEDURE — 99284 EMERGENCY DEPT VISIT MOD MDM: CPT

## 2025-02-16 NOTE — ED PROVIDER NOTES
Subjective   History of Present Illness  72 female with history of COPD presents after fall after coughing fit.  States he has been having these episodes going on for a year.  Landed on her knees and on her butt and then fell and hit her head.  Has not had any bleeding.  Denies neck pain.  No chest pain or shortness of breath with these coughing episodes.  Review of Systems  See HPI.  Past Medical History:   Diagnosis Date    COPD (chronic obstructive pulmonary disease)     Diabetes mellitus     Hyperlipidemia     Hypertension     Neuropathy     Rheumatoid arthritis     Tachycardia        Allergies   Allergen Reactions    Codeine Hives       Past Surgical History:   Procedure Laterality Date    BLADDER REPAIR      lift    CARTILAGE SURGERY Bilateral     knees    HERNIA REPAIR      HYSTERECTOMY      RECTAL PROLAPSE REPAIR         Family History   Problem Relation Age of Onset    COPD Mother     Lung cancer Father        Social History     Socioeconomic History    Marital status:    Tobacco Use    Smoking status: Some Days     Current packs/day: 0.50     Average packs/day: 0.5 packs/day for 30.0 years (15.0 ttl pk-yrs)     Types: Cigarettes    Tobacco comments:     [employer]uit [employer] [place] information provided    Substance and Sexual Activity    Alcohol use: No     Comment: socially    Drug use: No           Objective   Physical Exam  No acute distress, no Lovett sign, no raccoon eyes, no septal hematoma, no midline cervical spinal tenderness to palpation, somewhat aged appearing ecchymosis over bilateral knees without any open wounds noted, range of motion intact, no bony point tenderness to palpation bilaterally, no tenderness palpation over greater trochanters, diffuse lumbar tenderness to palpation without any bony point tenderness to palpation, no tenderness to palpation over thoracic spine, bilateral breath sounds present, no tachypnea or increased work of breathing, no murmur appreciated, regular rate and rhythm,  "abdomen soft and nontender without rebound or guarding.  Intact distal pulses.  Strength intact bilateral lower extremities.  Procedures           ED Course      /59   Pulse 85   Temp 97.9 °F (36.6 °C) (Oral)   Resp 18   Ht 165.1 cm (65\")   Wt 88.6 kg (195 lb 5.2 oz)   SpO2 99%   BMI 32.50 kg/m²   Labs Reviewed - No data to display  CT Head Without Contrast   Final Result   Impression:   1.No acute intracranial process identified.         Electronically Signed: Severiano Agee MD     2/16/2025 2:58 PM EST     Workstation ID: [id number]      CT Lumbar Spine Without Contrast   Final Result   Impression:   No acute osseous process identified.            Electronically Signed: Severiano Agee MD     2/16/2025 2:59 PM EST     Workstation ID: [id number]      XR Pelvis 1 or 2 View   Final Result   Impression:   1.Limited exam due to technique.   2.Moderate stool burden within the colon which could relate to constipation.   3.No definite acute osseous abnormality within the limitations of the exam. Correlation with clinical findings recommended as to the need for follow-up.            Electronically Signed: Ezekiel Rainey MD     2/16/2025 3:01 PM EST     Workstation ID: [id number]      XR Knee 3 View Bilateral   Final Result   Impression:   1.Osseous demineralization.   2.Small suprapatellar bursal joint effusion on the right.            Electronically Signed: Ezekiel Rainey MD     2/16/2025 3:03 PM EST     Workstation ID: [id number]                                                         Medical Decision Making  Problems Addressed:  Acute low back pain, unspecified back pain laterality, unspecified whether sciatica present: complicated acute illness or injury  Closed head injury, initial encounter: complicated acute illness or injury  Contusion of knee, unspecified laterality, initial encounter: complicated acute illness or injury    Amount and/or Complexity of Data Reviewed  Radiology: ordered.    My interpretation of CT " head is no subdural epidural hematoma.  See system for radiology interpretation.    Patient nontoxic-appearing with reassuring vital signs.  Imaging negative for acute findings.  Repeated falls secondary to coughing fits chronic in nature has been going on for a year per patient and states she is following up with her primary physician for this.  Will DC    Final diagnoses:   Contusion of knee, unspecified laterality, initial encounter   Closed head injury, initial encounter   Acute low back pain, unspecified back pain laterality, unspecified whether sciatica present       ED Disposition  ED Disposition       ED Disposition   Discharge    Condition   Stable    Comment   --               Gopal, Jose  [address] [facility] [address]  [place] [address]  [phone]    In 3 days      Bao Burrell MD  [address] [place] [id number] [phone]               Medication List      No changes were made to your prescriptions during this visit.            Mervin Navarro MD  02/17/25 5656

## 2025-02-19 LAB
BACTERIA SPEC AEROBE CULT: NORMAL
BACTERIA SPEC AEROBE CULT: NORMAL